# Patient Record
Sex: FEMALE | Race: WHITE | NOT HISPANIC OR LATINO | Employment: FULL TIME | URBAN - METROPOLITAN AREA
[De-identification: names, ages, dates, MRNs, and addresses within clinical notes are randomized per-mention and may not be internally consistent; named-entity substitution may affect disease eponyms.]

---

## 2018-11-26 ENCOUNTER — HOSPITAL ENCOUNTER (EMERGENCY)
Facility: HOSPITAL | Age: 33
Discharge: HOME | End: 2018-11-26
Attending: EMERGENCY MEDICINE
Payer: COMMERCIAL

## 2018-11-26 ENCOUNTER — APPOINTMENT (EMERGENCY)
Dept: RADIOLOGY | Facility: HOSPITAL | Age: 33
End: 2018-11-26
Payer: COMMERCIAL

## 2018-11-26 VITALS
SYSTOLIC BLOOD PRESSURE: 142 MMHG | HEART RATE: 77 BPM | OXYGEN SATURATION: 100 % | WEIGHT: 175 LBS | DIASTOLIC BLOOD PRESSURE: 68 MMHG | TEMPERATURE: 98.4 F | RESPIRATION RATE: 18 BRPM

## 2018-11-26 DIAGNOSIS — S39.012A LUMBAR STRAIN: Primary | ICD-10-CM

## 2018-11-26 PROCEDURE — 99284 EMERGENCY DEPT VISIT MOD MDM: CPT

## 2018-11-26 PROCEDURE — 72100 X-RAY EXAM L-S SPINE 2/3 VWS: CPT

## 2018-11-26 RX ORDER — IBUPROFEN 600 MG/1
600 TABLET ORAL ONCE
Status: DISCONTINUED | OUTPATIENT
Start: 2018-11-26 | End: 2018-11-26 | Stop reason: HOSPADM
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
## 2018-11-27 NOTE — DISCHARGE INSTRUCTIONS
Low Back Strain, Ambulatory Care   GENERAL INFORMATION:   Low back strain  is an injury to your lower back muscles or tendons  Tendons are strong tissues that connect muscles to bones  The lower back supports most of your body weight and helps you move, twist, and bend  Low back strain is usually caused by activities that increase stress on the lower back, such as exercise or injury  Common symptoms include the following:   · Low back pain or muscle spasms    · Stiffness or limited movement    · Pain that goes down to the buttocks, groin, or legs    · Pain that is worse with activity  Seek immediate care for the following symptoms:   · A pop in your lower back    · Increased swelling or pain in your lower back    · Trouble moving your legs    · Numbness in your legs  Treatment for low back strain:   · NSAIDs  help decrease swelling and pain or fever  This medicine is available with or without a doctor's order  NSAIDs can cause stomach bleeding or kidney problems in certain people  If you take blood thinner medicine, always ask your healthcare provider if NSAIDs are safe for you  Always read the medicine label and follow directions  · Muscle relaxers  help decrease muscle spasms pain  · Prescription pain medicine  may be given  Ask how to take this medicine safely  Manage your symptoms:   · Rest  in bed after your injury  Slowly start to increase your activity as the pain decreases, or as directed  · Apply ice  on your lower back for 15 to 20 minutes every hour or as directed  Use an ice pack, or put crushed ice in a plastic bag  Cover it with a towel  Ice helps prevent tissue damage and decreases swelling and pain  You can alternate ice and heat  · Apply heat  on your lower back for 20 to 30 minutes every 2 hours for as many days as directed  Heat helps decrease pain and muscle spasms  Prevent another low back strain:   · Use proper body mechanics        ¨ Bend at the hips and knees when you  objects  Do not bend from the waist  Use your leg muscles as you lift the load  Do not use your back  Keep the object close to your chest as you lift it  Try not to twist or lift anything above your waist     ¨ Change your position often when you stand for long periods of time  Rest one foot on a small box or footrest, and then switch to the other foot often  ¨ Try not to sit for long periods of time  When you do, sit in a straight-backed chair with your feet flat on the floor  Never reach, pull, or push while you are sitting  · Exercise regularly  Warm up before you exercise  Do exercises that strengthen your back muscles  Ask about the best exercise plan for you  · Maintain a healthy weight  Ask your healthcare provider how much you should weigh  Ask him to help you create a weight loss plan if you are overweight  Follow up with your healthcare provider as directed:  Write down your questions so you remember to ask them during your visits  CARE AGREEMENT:   You have the right to help plan your care  Learn about your health condition and how it may be treated  Discuss treatment options with your caregivers to decide what care you want to receive  You always have the right to refuse treatment  The above information is an  only  It is not intended as medical advice for individual conditions or treatments  Talk to your doctor, nurse or pharmacist before following any medical regimen to see if it is safe and effective for you  © 2014 [address] is for End User's use only and may not be sold, redistributed or otherwise used for commercial purposes  All illustrations and images included in CareNotes® are the copyrighted property of [employer]  or [employer]

## 2020-07-24 ENCOUNTER — APPOINTMENT (EMERGENCY)
Dept: CT IMAGING | Facility: HOSPITAL | Age: 35
End: 2020-07-24
Payer: COMMERCIAL

## 2020-07-24 ENCOUNTER — HOSPITAL ENCOUNTER (EMERGENCY)
Facility: HOSPITAL | Age: 35
Discharge: HOME | End: 2020-07-24
Attending: EMERGENCY MEDICINE | Admitting: EMERGENCY MEDICINE
Payer: COMMERCIAL

## 2020-07-24 VITALS
RESPIRATION RATE: 20 BRPM | DIASTOLIC BLOOD PRESSURE: 60 MMHG | WEIGHT: 177.91 LBS | HEIGHT: 61 IN | BODY MASS INDEX: 33.59 KG/M2 | SYSTOLIC BLOOD PRESSURE: 102 MMHG | TEMPERATURE: 98.2 F | HEART RATE: 58 BPM | OXYGEN SATURATION: 98 %

## 2020-07-24 DIAGNOSIS — R20.2 PARESTHESIA OF RIGHT ARM AND LEG: ICD-10-CM

## 2020-07-24 DIAGNOSIS — R51.9 HEADACHE: ICD-10-CM

## 2020-07-24 DIAGNOSIS — R20.2 FACIAL PARESTHESIA: Primary | ICD-10-CM

## 2020-07-24 LAB
ANION GAP SERPL CALCULATED.3IONS-SCNC: 11 MMOL/L (ref 4–13)
APTT PPP: 33 SECONDS (ref 23–37)
ATRIAL RATE: 71 BPM
B-HCG SERPL-ACNC: <2 MIU/ML
BUN SERPL-MCNC: 13 MG/DL (ref 5–25)
CALCIUM SERPL-MCNC: 8.6 MG/DL (ref 8.3–10.1)
CHLORIDE SERPL-SCNC: 106 MMOL/L (ref 100–108)
CO2 SERPL-SCNC: 24 MMOL/L (ref 21–32)
CREAT SERPL-MCNC: 0.8 MG/DL (ref 0.6–1.3)
ERYTHROCYTE [DISTWIDTH] IN BLOOD BY AUTOMATED COUNT: 13.3 % (ref 11.6–15.1)
GFR SERPL CREATININE-BSD FRML MDRD: 98 ML/MIN/1.73SQ M
GLUCOSE SERPL-MCNC: 72 MG/DL (ref 65–140)
GLUCOSE SERPL-MCNC: 88 MG/DL (ref 65–140)
HCT VFR BLD AUTO: 41.7 % (ref 34.8–46.1)
HGB BLD-MCNC: 13.9 G/DL (ref 11.5–15.4)
INR PPP: 1.06 (ref 0.84–1.19)
MCH RBC QN AUTO: 30 PG (ref 26.8–34.3)
MCHC RBC AUTO-ENTMCNC: 33.3 G/DL (ref 31.4–37.4)
MCV RBC AUTO: 90 FL (ref 82–98)
P AXIS: 58 DEGREES
PLATELET # BLD AUTO: 185 THOUSANDS/UL (ref 149–390)
PMV BLD AUTO: 11.3 FL (ref 8.9–12.7)
POTASSIUM SERPL-SCNC: 3.6 MMOL/L (ref 3.5–5.3)
PR INTERVAL: 156 MS
PROTHROMBIN TIME: 14 SECONDS (ref 11.6–14.5)
QRS AXIS: 67 DEGREES
QRSD INTERVAL: 90 MS
QT INTERVAL: 386 MS
QTC INTERVAL: 419 MS
RBC # BLD AUTO: 4.63 MILLION/UL (ref 3.81–5.12)
SODIUM SERPL-SCNC: 141 MMOL/L (ref 136–145)
T WAVE AXIS: 45 DEGREES
TROPONIN I SERPL-MCNC: <0.02 NG/ML
VENTRICULAR RATE: 71 BPM
WBC # BLD AUTO: 8.27 THOUSAND/UL (ref 4.31–10.16)

## 2020-07-24 PROCEDURE — 36415 COLL VENOUS BLD VENIPUNCTURE: CPT | Performed by: PHYSICIAN ASSISTANT

## 2020-07-24 PROCEDURE — 99285 EMERGENCY DEPT VISIT HI MDM: CPT | Performed by: PHYSICIAN ASSISTANT

## 2020-07-24 PROCEDURE — 85027 COMPLETE CBC AUTOMATED: CPT | Performed by: PHYSICIAN ASSISTANT

## 2020-07-24 PROCEDURE — 70496 CT ANGIOGRAPHY HEAD: CPT

## 2020-07-24 PROCEDURE — 84702 CHORIONIC GONADOTROPIN TEST: CPT | Performed by: PHYSICIAN ASSISTANT

## 2020-07-24 PROCEDURE — 93005 ELECTROCARDIOGRAM TRACING: CPT

## 2020-07-24 PROCEDURE — 70498 CT ANGIOGRAPHY NECK: CPT

## 2020-07-24 PROCEDURE — 99285 EMERGENCY DEPT VISIT HI MDM: CPT

## 2020-07-24 PROCEDURE — 80048 BASIC METABOLIC PNL TOTAL CA: CPT | Performed by: PHYSICIAN ASSISTANT

## 2020-07-24 PROCEDURE — 96374 THER/PROPH/DIAG INJ IV PUSH: CPT

## 2020-07-24 PROCEDURE — 82948 REAGENT STRIP/BLOOD GLUCOSE: CPT

## 2020-07-24 PROCEDURE — 86618 LYME DISEASE ANTIBODY: CPT | Performed by: PHYSICIAN ASSISTANT

## 2020-07-24 PROCEDURE — 85610 PROTHROMBIN TIME: CPT | Performed by: PHYSICIAN ASSISTANT

## 2020-07-24 PROCEDURE — 84484 ASSAY OF TROPONIN QUANT: CPT | Performed by: PHYSICIAN ASSISTANT

## 2020-07-24 PROCEDURE — 85730 THROMBOPLASTIN TIME PARTIAL: CPT | Performed by: PHYSICIAN ASSISTANT

## 2020-07-24 PROCEDURE — 93010 ELECTROCARDIOGRAM REPORT: CPT | Performed by: INTERNAL MEDICINE

## 2020-07-24 RX ORDER — METOCLOPRAMIDE HYDROCHLORIDE 5 MG/ML
10 INJECTION INTRAMUSCULAR; INTRAVENOUS ONCE
Status: DISCONTINUED | OUTPATIENT
Start: 2020-07-24 | End: 2020-07-24 | Stop reason: HOSPADM

## 2020-07-24 RX ORDER — ONDANSETRON 2 MG/ML
4 INJECTION INTRAMUSCULAR; INTRAVENOUS ONCE
Status: COMPLETED | OUTPATIENT
Start: 2020-07-24 | End: 2020-07-24

## 2020-07-24 RX ORDER — DIPHENHYDRAMINE HYDROCHLORIDE 50 MG/ML
25 INJECTION INTRAMUSCULAR; INTRAVENOUS ONCE
Status: DISCONTINUED | OUTPATIENT
Start: 2020-07-24 | End: 2020-07-24 | Stop reason: HOSPADM

## 2020-07-24 RX ADMIN — ONDANSETRON 4 MG: 2 INJECTION INTRAMUSCULAR; INTRAVENOUS at 05:41

## 2020-07-24 RX ADMIN — IOHEXOL 85 ML: 350 INJECTION, SOLUTION INTRAVENOUS at 06:40

## 2020-07-24 NOTE — ED CARE HANDOFF
Emergency Department Sign Out Note        Sign out and transfer of care from Kushal Olivas PA-C  See Separate Emergency Department note  The patient, Chris Crane, was evaluated by the previous provider for facial numbness  Workup Completed:  Labs    ED Course / Workup Pending (followup):  CTA head  Patient's laboratory evaluation was unremarkable  CT of the head radiology report was reviewed  No acute abnormalities  Patient's symptoms completely resolved during her time here in the emergency department  I reviewed her CT scan results with her at bedside  Discussed diagnosis of facial paresthesias  Discussed outpatient follow up with primary care physician and Neurology for further evaluation of symptoms  Reviewed reasons to return to ed  Patient verbalized understanding of diagnosis and agreement with discharge plan of care as well as understanding of reasons to return to ed  Procedures  MDM    Disposition  Final diagnoses:   Facial paresthesia   Paresthesia of right arm and leg   Headache     Time reflects when diagnosis was documented in both MDM as applicable and the Disposition within this note     Time User Action Codes Description Comment    7/24/2020  7:09 AM Abby Bucy Add [R20 2] Facial paresthesia     7/24/2020  7:09 AM Abby Bucy Add [R20 2] Paresthesia of right arm and leg     7/24/2020  7:09 AM Abby Bucy Add [R51] Headache       ED Disposition     ED Disposition Condition Date/Time Comment    Discharge Stable Fri Jul 24, 2020  7:09 AM Chris Crane discharge to home/self care              Follow-up Information     Follow up With Specialties Details Why Contact Info Additional [facility] Schedule an appointment as soon as possible for a visit in 3 days For further evaluation [address] [facility] [id number]  [address] Neu[facility] [address] [facility] JUAN JOSÉ[facility] HospitalVANELily[address], [address]    [facility] Emergency Department Emergency Medicine Go to  If symptoms worsen [address], [address] [place], [place], [address]        There are no discharge medications for this patient  No discharge procedures on file         ED Provider  Electronically Signed by     Alejandro Alejandre PA-C  07/24/20 0625

## 2020-07-24 NOTE — ED PROVIDER NOTES
History  Chief Complaint   Patient presents with    Facial Numbness     pt c/o intermittant tingling and numbness in her R cheek; pt also c/o numbness in R arm as well; pt first noticed symptoms when she woke up in the night to use the bathroom      72-year-old female with past medical history significant for anxiety, anemia, and allergic rhinitis who presents to the emergency department for complaint of right-sided facial numbness and right arm numbness noticed this morning upon waking to use the bathroom  Patient reports her daughter woke her up suddenly from sleeping and symptoms began shortly after this around 3A  She admits to a numb sensation on the inside of the right cheek, reports it feels like she is drooling and cannot control secretions, also reports right arm and leg paresthesias and headache coming and going with nausea  She called her father to come to the house, states symptoms seemed to improve after 45 minutes, then returned again  Has a hx of sporadic anxiety, not on any meds, is unsure if it could be this but has never had these specific symptoms with anxiety/panic in the past  Denies facial droop, slurred speech, confusion, weakness or extremities, lightheadedness/dizziness, syncope, visual disturbances, vomiting  Denies any recent dental work or feeling of intraoral swelling  Denies any recent tick bite or extraction  No hx of Lyme or CVA  Denies recent viral or flu-like symptoms  No new meds or supplements  No alcohol or drug use  None       History reviewed  No pertinent past medical history  Past Surgical History:   Procedure Laterality Date    BREAST SURGERY       SECTION      LIPOSUCTION         History reviewed  No pertinent family history  I have reviewed and agree with the history as documented      E-Cigarette/Vaping     E-Cigarette/Vaping Substances     Social History     Tobacco Use    Smoking status: Never Smoker    Smokeless tobacco: Never Used Substance Use Topics    Alcohol use: No    Drug use: No       Review of Systems   Constitutional: Negative for chills, diaphoresis, fatigue and fever  HENT: Positive for drooling  Negative for congestion, dental problem, facial swelling, sinus pressure, sinus pain, sore throat, trouble swallowing and voice change  Eyes: Negative for photophobia, pain and visual disturbance  Respiratory: Negative for chest tightness and shortness of breath  Cardiovascular: Negative for chest pain and palpitations  Gastrointestinal: Positive for nausea  Negative for abdominal pain and vomiting  Musculoskeletal: Negative for gait problem, neck pain and neck stiffness  Skin: Negative for color change and rash  Neurological: Positive for numbness and headaches  Negative for dizziness, tremors, seizures, syncope, facial asymmetry, speech difficulty, weakness and light-headedness  Psychiatric/Behavioral: Negative for confusion  All other systems reviewed and are negative  Physical Exam  Physical Exam   Constitutional: She is oriented to person, place, and time  She appears well-developed and well-nourished  She is cooperative  Non-toxic appearance  No distress  No large facial droop, possibly some very slight right side mouth droop    HENT:   Head: Normocephalic and atraumatic  Mouth/Throat: Uvula is midline, oropharynx is clear and moist and mucous membranes are normal  Tonsils are 0 on the right  Tonsils are 0 on the left  Eyes: Pupils are equal, round, and reactive to light  Conjunctivae and EOM are normal    Neck: Normal range of motion and full passive range of motion without pain  Neck supple  Cardiovascular: Normal rate, regular rhythm, normal heart sounds, intact distal pulses and normal pulses  No murmur heard  Pulmonary/Chest: Effort normal and breath sounds normal    Musculoskeletal: Normal range of motion  Neurological: She is alert and oriented to person, place, and time   She has normal strength  She displays no tremor  A sensory deficit (decreased sensation to dull touch on the medial aspect of the RUE; trigeminal N fields intact, all other extremities with sensation intact) is present  No cranial nerve deficit  She displays a negative Romberg sign  Coordination and gait normal  GCS eye subscore is 4  GCS verbal subscore is 5  GCS motor subscore is 6  Patient became tearful and anxious during neuro exam, stating "I think I'm having an anxiety attack"   Skin: Skin is warm  Capillary refill takes less than 2 seconds  No lesion and no rash noted  No erythema  Psychiatric: Her speech is normal and behavior is normal  Thought content normal  Her mood appears anxious  Vitals reviewed        Vital Signs  ED Triage Vitals   Temperature Pulse Respirations Blood Pressure SpO2   07/24/20 0459 07/24/20 0457 07/24/20 0457 07/24/20 0457 07/24/20 0457   98 2 °F (36 8 °C) 77 18 120/84 100 %      Temp Source Heart Rate Source Patient Position - Orthostatic VS BP Location FiO2 (%)   07/24/20 0459 07/24/20 0457 07/24/20 0457 07/24/20 0457 --   Oral Monitor Sitting Right arm       Pain Score       07/24/20 0457       No Pain           Vitals:    07/24/20 0530 07/24/20 0545 07/24/20 0600 07/24/20 0630   BP: 101/61 108/65 108/65 102/60   Pulse: 75 70 59 58   Patient Position - Orthostatic VS: Sitting Sitting Sitting Sitting         Visual Acuity  Visual Acuity      Most Recent Value   L Pupil Size (mm)  3   R Pupil Size (mm)  3          ED Medications  Medications   ondansetron (ZOFRAN) injection 4 mg (4 mg Intravenous Given 7/24/20 0541)   iohexol (OMNIPAQUE) 350 MG/ML injection (MULTI-DOSE) 85 mL (85 mL Intravenous Given 7/24/20 0640)       Diagnostic Studies  Results Reviewed     Procedure Component Value Units Date/Time    Fingerstick Glucose (POCT) [[id number]]  (Normal) Collected:  07/24/20 0604    Lab Status:  Final result Updated:  07/24/20 0627     POC Glucose 72 mg/dl     Basic metabolic panel [[id number]] Collected:  07/24/20 0541    Lab Status:  Final result Specimen:  Blood from Arm, Left Updated:  07/24/20 1561     Sodium 141 mmol/L      Potassium 3 6 mmol/L      Chloride 106 mmol/L      CO2 24 mmol/L      ANION GAP 11 mmol/L      BUN 13 mg/dL      Creatinine 0 80 mg/dL      Glucose 88 mg/dL      Calcium 8 6 mg/dL      eGFR 98 ml/min/1 73sq m     Narrative:       National Kidney Disease Foundation guidelines for Chronic Kidney Disease (CKD):     Stage 1 with normal or high GFR (GFR > 90 mL/min/1 73 square meters)    Stage 2 Mild CKD (GFR = 60-89 mL/min/1 73 square meters)    Stage 3A Moderate CKD (GFR = 45-59 mL/min/1 73 square meters)    Stage 3B Moderate CKD (GFR = 30-44 mL/min/1 73 square meters)    Stage 4 Severe CKD (GFR = 15-29 mL/min/1 73 square meters)    Stage 5 End Stage CKD (GFR <15 mL/min/1 73 square meters)  Note: GFR calculation is accurate only with a steady state creatinine    Quantitative hCG [[id number]]  (Normal) Collected:  07/24/20 0541    Lab Status:  Final result Specimen:  Blood from Arm, Left Updated:  07/24/20 0619     HCG, Quant <2 mIU/mL     Narrative:        Expected Ranges:     Approximate               Approximate HCG  Gestation age          Concentration ( mIU/mL)  _____________          ______________________   [facility]                      HCG values  0 2-1                       5-50  1-2                           2-3                         100-5000  3-4                         500-65745  4-5                         1000-78024  5-6                         [id number]  6-8                         37612-165909  8-12                        38092-575791      Troponin I [[id number]]  (Normal) Collected:  07/24/20 0541    Lab Status:  Final result Specimen:  Blood from Arm, Left Updated:  07/24/20 0614     Troponin I <0 02 ng/mL     Protime-INR [[id number]]  (Normal) Collected:  07/24/20 0541    Lab Status:  Final result Specimen:  Blood from Arm, Left Updated: 07/24/20 0608     Protime 14 0 seconds      INR 1 06    APTT [[id number]]  (Normal) Collected:  07/24/20 0541    Lab Status:  Final result Specimen:  Blood from Arm, Left Updated:  07/24/20 0608     PTT 33 seconds     CBC and Platelet [[id number]]  (Normal) Collected:  07/24/20 0541    Lab Status:  Final result Specimen:  Blood from Arm, Left Updated:  07/24/20 0602     WBC 8 27 Thousand/uL      RBC 4 63 Million/uL      Hemoglobin 13 9 g/dL      Hematocrit 41 7 %      MCV 90 fL      MCH 30 0 pg      MCHC 33 3 g/dL      RDW 13 3 %      Platelets 690 Thousands/uL      MPV 11 3 fL     Lyme Antibody Profile with reflex to [facility] [[id number]] Collected:  07/24/20 0541    Lab Status: In process Specimen:  Blood from Arm, Left Updated:  07/24/20 0546                 CTA head and neck with and without contrast   Final Result by Sanjay Lauren DO (07/24 2792)   1  No acute intracranial abnormality  2   Unremarkable CTA of the neck and brain  No evidence of aneurysm, vascular malformation or vascular cut off  No evidence of large vessel central occlusive disease involving the [place]  3   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  4   Partially empty sella turcica  Clinical correlation recommended  Consider follow-up neurology consultation  Workstation performed: [id number]                    Procedures  Procedures         ED Course  ED Course as of Jul 25 0312 Fri Jul 24, 2020   1394 Patient would like to hold off on Reglan and Benadryl, per nursing, due to issue with burning from IV after Zofran  8826 CTA head and neck with and without contrast   0708 On re-assessment, patient states symptoms have resolved, she is feeling better  Pending normal scan, she will be appropriate for dc  Will provide neuro f/u, should this recur  US AUDIT      Most Recent Value   Initial Alcohol Screen: US AUDIT-C    1   How often do you have a drink containing alcohol?  0 Filed at: 07/24/2020 0752   2  How many drinks containing alcohol do you have on a typical day you are drinking? 0 Filed at: 07/24/2020 0752   3b  FEMALE Any Age, or MALE 65+: How often do you have 4 or more drinks on one occassion? 0 Filed at: 07/24/2020 0752   Audit-C Score  0 Filed at: 07/24/2020 4069                  SARA/DAST-10      Most Recent Value   How many times in the past year have you    Used an illegal drug or used a prescription medication for non-medical reasons? Never Filed at: 07/24/2020 1316                                MDM  Number of Diagnoses or Management Options  Facial paresthesia:   Headache:   Paresthesia of right arm and leg:   Diagnosis management comments: Diff dx includes:  CVA, cerebral dissection, complex migraine, Lyme disease, Bell's Palsy, anxiety, hypo or hyperglycemia, thyroid disease, electrolyte abnormality       Amount and/or Complexity of Data Reviewed  Clinical lab tests: ordered and reviewed  Tests in the radiology section of CPT®: ordered and reviewed  Tests in the medicine section of CPT®: ordered and reviewed  Discussion of test results with the performing providers: yes  Decide to obtain previous medical records or to obtain history from someone other than the patient: yes  Obtain history from someone other than the patient: yes  Review and summarize past medical records: yes  Discuss the patient with other providers: yes  Independent visualization of images, tracings, or specimens: yes    Risk of Complications, Morbidity, and/or Mortality  General comments: See ED course note for dispo and plan  I reviewed and discussed all lab and imaging findings with the patient at bedside  I discussed emergency department return parameters  I answered any and all questions the patient had regarding emergency department course of evaluation and treatment  The patient verbalized understanding of and agreement with plan        Patient Progress  Patient progress: improved        Disposition  Final diagnoses:   Facial paresthesia   Paresthesia of right arm and leg   Headache     Time reflects when diagnosis was documented in both MDM as applicable and the Disposition within this note     Time User Action Codes Description Comment    7/24/2020  7:09 AM Charlie Bumpers Add [R20 2] Facial paresthesia     7/24/2020  7:09 AM Charlie Bumpers Add [R20 2] Paresthesia of right arm and leg     7/24/2020  7:09 AM Charlie Bumpers Add [R51] Headache       ED Disposition     ED Disposition Condition Date/Time Comment    Discharge Stable Fri Jul 24, 2020  7:09 AM Saritha Neville discharge to home/self care  Follow-up Information     Follow up With Specialties Details Why Contact Info Additional [facility] Schedule an appointment as soon as possible for a visit in 3 days For further evaluation [address] [facility] [id number]  [address] Neurology [address] [facility], [address], [address] [facility]    [address] [facility] Emergency Department Emergency Medicine Go to  If symptoms worsen [address] [facility] [address], [address] [place], [place], [address]          There are no discharge medications for this patient  No discharge procedures on file      PDMP Review     None          ED Provider  Electronically Signed by           Jem Fragoso PA-C  07/25/20 8009

## 2020-07-25 LAB — B BURGDOR IGG+IGM SER-ACNC: <0.91 ISR (ref 0–0.9)

## 2020-12-16 ENCOUNTER — HOSPITAL ENCOUNTER (EMERGENCY)
Facility: HOSPITAL | Age: 35
Discharge: HOME | End: 2020-12-16
Attending: EMERGENCY MEDICINE | Admitting: EMERGENCY MEDICINE
Payer: COMMERCIAL

## 2020-12-16 ENCOUNTER — APPOINTMENT (EMERGENCY)
Dept: ULTRASOUND IMAGING | Facility: HOSPITAL | Age: 35
End: 2020-12-16
Payer: COMMERCIAL

## 2020-12-16 VITALS
OXYGEN SATURATION: 99 % | DIASTOLIC BLOOD PRESSURE: 69 MMHG | RESPIRATION RATE: 17 BRPM | SYSTOLIC BLOOD PRESSURE: 115 MMHG | BODY MASS INDEX: 33.16 KG/M2 | WEIGHT: 175.49 LBS | HEART RATE: 80 BPM | TEMPERATURE: 97.2 F

## 2020-12-16 DIAGNOSIS — O46.90 VAGINAL BLEEDING IN PREGNANCY: Primary | ICD-10-CM

## 2020-12-16 LAB
ABO GROUP BLD: NORMAL
ALBUMIN SERPL BCP-MCNC: 3.7 G/DL (ref 3.5–5)
ALP SERPL-CCNC: 74 U/L (ref 46–116)
ALT SERPL W P-5'-P-CCNC: 33 U/L (ref 12–78)
ANION GAP SERPL CALCULATED.3IONS-SCNC: 10 MMOL/L (ref 4–13)
AST SERPL W P-5'-P-CCNC: 29 U/L (ref 5–45)
B-HCG SERPL-ACNC: 417 MIU/ML
BACTERIA UR QL AUTO: NORMAL /HPF
BASOPHILS # BLD AUTO: 0.08 THOUSANDS/ΜL (ref 0–0.1)
BASOPHILS NFR BLD AUTO: 1 % (ref 0–1)
BILIRUB SERPL-MCNC: 0.6 MG/DL (ref 0.2–1)
BILIRUB UR QL STRIP: NEGATIVE
BUN SERPL-MCNC: 11 MG/DL (ref 5–25)
CALCIUM SERPL-MCNC: 9.2 MG/DL (ref 8.3–10.1)
CHLORIDE SERPL-SCNC: 106 MMOL/L (ref 100–108)
CLARITY UR: ABNORMAL
CO2 SERPL-SCNC: 25 MMOL/L (ref 21–32)
COLOR UR: ABNORMAL
CREAT SERPL-MCNC: 0.78 MG/DL (ref 0.6–1.3)
EOSINOPHIL # BLD AUTO: 0.04 THOUSAND/ΜL (ref 0–0.61)
EOSINOPHIL NFR BLD AUTO: 0 % (ref 0–6)
ERYTHROCYTE [DISTWIDTH] IN BLOOD BY AUTOMATED COUNT: 14.2 % (ref 11.6–15.1)
EXT PREG TEST URINE: POSITIVE
EXT. CONTROL ED NAV: ABNORMAL
GFR SERPL CREATININE-BSD FRML MDRD: 100 ML/MIN/1.73SQ M
GLUCOSE SERPL-MCNC: 90 MG/DL (ref 65–140)
GLUCOSE UR STRIP-MCNC: NEGATIVE MG/DL
HCT VFR BLD AUTO: 38.4 % (ref 34.8–46.1)
HGB BLD-MCNC: 12.7 G/DL (ref 11.5–15.4)
HGB UR QL STRIP.AUTO: ABNORMAL
IMM GRANULOCYTES # BLD AUTO: 0.03 THOUSAND/UL (ref 0–0.2)
IMM GRANULOCYTES NFR BLD AUTO: 0 % (ref 0–2)
KETONES UR STRIP-MCNC: NEGATIVE MG/DL
LEUKOCYTE ESTERASE UR QL STRIP: NEGATIVE
LYMPHOCYTES # BLD AUTO: 2.37 THOUSANDS/ΜL (ref 0.6–4.47)
LYMPHOCYTES NFR BLD AUTO: 24 % (ref 14–44)
MCH RBC QN AUTO: 28.3 PG (ref 26.8–34.3)
MCHC RBC AUTO-ENTMCNC: 33.1 G/DL (ref 31.4–37.4)
MCV RBC AUTO: 86 FL (ref 82–98)
MONOCYTES # BLD AUTO: 0.63 THOUSAND/ΜL (ref 0.17–1.22)
MONOCYTES NFR BLD AUTO: 7 % (ref 4–12)
NEUTROPHILS # BLD AUTO: 6.57 THOUSANDS/ΜL (ref 1.85–7.62)
NEUTS SEG NFR BLD AUTO: 68 % (ref 43–75)
NITRITE UR QL STRIP: NEGATIVE
NON-SQ EPI CELLS URNS QL MICRO: NORMAL /HPF
NRBC BLD AUTO-RTO: 0 /100 WBCS
PH UR STRIP.AUTO: 6 [PH]
PLATELET # BLD AUTO: 229 THOUSANDS/UL (ref 149–390)
PMV BLD AUTO: 10.3 FL (ref 8.9–12.7)
POTASSIUM SERPL-SCNC: 3.9 MMOL/L (ref 3.5–5.3)
PROT SERPL-MCNC: 7 G/DL (ref 6.4–8.2)
PROT UR STRIP-MCNC: NEGATIVE MG/DL
RBC # BLD AUTO: 4.49 MILLION/UL (ref 3.81–5.12)
RBC #/AREA URNS AUTO: NORMAL /HPF
RH BLD: POSITIVE
SODIUM SERPL-SCNC: 141 MMOL/L (ref 136–145)
SP GR UR STRIP.AUTO: <=1.005 (ref 1–1.03)
UROBILINOGEN UR QL STRIP.AUTO: 0.2 E.U./DL
WBC # BLD AUTO: 9.72 THOUSAND/UL (ref 4.31–10.16)
WBC #/AREA URNS AUTO: NORMAL /HPF

## 2020-12-16 PROCEDURE — 86900 BLOOD TYPING SEROLOGIC ABO: CPT | Performed by: EMERGENCY MEDICINE

## 2020-12-16 PROCEDURE — 84702 CHORIONIC GONADOTROPIN TEST: CPT | Performed by: EMERGENCY MEDICINE

## 2020-12-16 PROCEDURE — 81001 URINALYSIS AUTO W/SCOPE: CPT | Performed by: EMERGENCY MEDICINE

## 2020-12-16 PROCEDURE — 99284 EMERGENCY DEPT VISIT MOD MDM: CPT | Performed by: EMERGENCY MEDICINE

## 2020-12-16 PROCEDURE — 86901 BLOOD TYPING SEROLOGIC RH(D): CPT | Performed by: EMERGENCY MEDICINE

## 2020-12-16 PROCEDURE — 76815 OB US LIMITED FETUS(S): CPT

## 2020-12-16 PROCEDURE — 80053 COMPREHEN METABOLIC PANEL: CPT | Performed by: EMERGENCY MEDICINE

## 2020-12-16 PROCEDURE — 81025 URINE PREGNANCY TEST: CPT | Performed by: EMERGENCY MEDICINE

## 2020-12-16 PROCEDURE — 36415 COLL VENOUS BLD VENIPUNCTURE: CPT | Performed by: EMERGENCY MEDICINE

## 2020-12-16 PROCEDURE — 99284 EMERGENCY DEPT VISIT MOD MDM: CPT

## 2020-12-16 PROCEDURE — 85025 COMPLETE CBC W/AUTO DIFF WBC: CPT | Performed by: EMERGENCY MEDICINE

## 2020-12-16 RX ORDER — ACETAMINOPHEN 325 MG/1
650 TABLET ORAL ONCE
Status: COMPLETED | OUTPATIENT
Start: 2020-12-16 | End: 2020-12-16

## 2020-12-16 RX ADMIN — ACETAMINOPHEN 650 MG: 325 TABLET, FILM COATED ORAL at 13:16

## 2024-05-01 ENCOUNTER — HOSPITAL ENCOUNTER (EMERGENCY)
Facility: HOSPITAL | Age: 39
Discharge: HOME | End: 2024-05-02
Attending: EMERGENCY MEDICINE | Admitting: EMERGENCY MEDICINE
Payer: COMMERCIAL

## 2024-05-01 DIAGNOSIS — N83.209 OVARIAN CYST: ICD-10-CM

## 2024-05-01 DIAGNOSIS — M79.89 LEG SWELLING: ICD-10-CM

## 2024-05-01 DIAGNOSIS — O03.4 RETAINED PRODUCTS OF CONCEPTION FOLLOWING ABORTION: ICD-10-CM

## 2024-05-01 DIAGNOSIS — N39.0 UTI (URINARY TRACT INFECTION): Primary | ICD-10-CM

## 2024-05-01 PROCEDURE — 99284 EMERGENCY DEPT VISIT MOD MDM: CPT

## 2024-05-02 ENCOUNTER — APPOINTMENT (EMERGENCY)
Dept: CT IMAGING | Facility: HOSPITAL | Age: 39
End: 2024-05-02
Payer: COMMERCIAL

## 2024-05-02 VITALS
WEIGHT: 180 LBS | DIASTOLIC BLOOD PRESSURE: 55 MMHG | OXYGEN SATURATION: 98 % | SYSTOLIC BLOOD PRESSURE: 91 MMHG | BODY MASS INDEX: 33.99 KG/M2 | RESPIRATION RATE: 16 BRPM | HEIGHT: 61 IN | TEMPERATURE: 97.8 F | HEART RATE: 73 BPM

## 2024-05-02 LAB
ATRIAL RATE: 74 BPM
B-HCG SERPL-ACNC: 13.3 MIU/ML (ref 0–5)
BACTERIA UR QL AUTO: ABNORMAL /HPF
BASOPHILS # BLD AUTO: 0.11 THOUSANDS/ÂΜL (ref 0–0.1)
BASOPHILS NFR BLD AUTO: 1 % (ref 0–1)
BILIRUB UR QL STRIP: NEGATIVE
BNP SERPL-MCNC: 11 PG/ML (ref 0–100)
CARDIAC TROPONIN I PNL SERPL HS: <2 NG/L
CARDIAC TROPONIN I PNL SERPL HS: <2 NG/L
CLARITY UR: ABNORMAL
COLOR UR: YELLOW
D DIMER PPP FEU-MCNC: <0.27 UG/ML FEU
EOSINOPHIL # BLD AUTO: 0.34 THOUSAND/ÂΜL (ref 0–0.61)
EOSINOPHIL NFR BLD AUTO: 4 % (ref 0–6)
ERYTHROCYTE [DISTWIDTH] IN BLOOD BY AUTOMATED COUNT: 16.2 % (ref 11.6–15.1)
GLUCOSE UR STRIP-MCNC: NEGATIVE MG/DL
HCT VFR BLD AUTO: 40.7 % (ref 34.8–46.1)
HGB BLD-MCNC: 13.1 G/DL (ref 11.5–15.4)
HGB UR QL STRIP.AUTO: ABNORMAL
IMM GRANULOCYTES # BLD AUTO: 0.03 THOUSAND/UL (ref 0–0.2)
IMM GRANULOCYTES NFR BLD AUTO: 0 % (ref 0–2)
KETONES UR STRIP-MCNC: NEGATIVE MG/DL
LEUKOCYTE ESTERASE UR QL STRIP: ABNORMAL
LIPASE SERPL-CCNC: 59 U/L (ref 11–82)
LYMPHOCYTES # BLD AUTO: 3.87 THOUSANDS/ÂΜL (ref 0.6–4.47)
LYMPHOCYTES NFR BLD AUTO: 39 % (ref 14–44)
MCH RBC QN AUTO: 26.4 PG (ref 26.8–34.3)
MCHC RBC AUTO-ENTMCNC: 32.2 G/DL (ref 31.4–37.4)
MCV RBC AUTO: 82 FL (ref 82–98)
MONOCYTES # BLD AUTO: 0.74 THOUSAND/ÂΜL (ref 0.17–1.22)
MONOCYTES NFR BLD AUTO: 8 % (ref 4–12)
NEUTROPHILS # BLD AUTO: 4.74 THOUSANDS/ÂΜL (ref 1.85–7.62)
NEUTS SEG NFR BLD AUTO: 48 % (ref 43–75)
NITRITE UR QL STRIP: NEGATIVE
NON-SQ EPI CELLS URNS QL MICRO: ABNORMAL /HPF
NRBC BLD AUTO-RTO: 0 /100 WBCS
P AXIS: 69 DEGREES
PH UR STRIP.AUTO: 5.5 [PH]
PLATELET # BLD AUTO: 245 THOUSANDS/UL (ref 149–390)
PMV BLD AUTO: 11 FL (ref 8.9–12.7)
PR INTERVAL: 160 MS
PROT UR STRIP-MCNC: ABNORMAL MG/DL
QRS AXIS: 93 DEGREES
QRSD INTERVAL: 98 MS
QT INTERVAL: 394 MS
QTC INTERVAL: 437 MS
RBC # BLD AUTO: 4.97 MILLION/UL (ref 3.81–5.12)
RBC #/AREA URNS AUTO: ABNORMAL /HPF
SP GR UR STRIP.AUTO: 1.02 (ref 1–1.03)
T WAVE AXIS: 35 DEGREES
UROBILINOGEN UR STRIP-ACNC: <2 MG/DL
VENTRICULAR RATE: 74 BPM
WBC # BLD AUTO: 9.83 THOUSAND/UL (ref 4.31–10.16)
WBC #/AREA URNS AUTO: ABNORMAL /HPF
WBC CLUMPS # UR AUTO: PRESENT /UL

## 2024-05-02 PROCEDURE — 85025 COMPLETE CBC W/AUTO DIFF WBC: CPT | Performed by: EMERGENCY MEDICINE

## 2024-05-02 PROCEDURE — 85379 FIBRIN DEGRADATION QUANT: CPT

## 2024-05-02 PROCEDURE — 71275 CT ANGIOGRAPHY CHEST: CPT

## 2024-05-02 PROCEDURE — 96365 THER/PROPH/DIAG IV INF INIT: CPT

## 2024-05-02 PROCEDURE — 84702 CHORIONIC GONADOTROPIN TEST: CPT | Performed by: EMERGENCY MEDICINE

## 2024-05-02 PROCEDURE — 93005 ELECTROCARDIOGRAM TRACING: CPT

## 2024-05-02 PROCEDURE — 81001 URINALYSIS AUTO W/SCOPE: CPT | Performed by: EMERGENCY MEDICINE

## 2024-05-02 PROCEDURE — 74177 CT ABD & PELVIS W/CONTRAST: CPT

## 2024-05-02 PROCEDURE — 84484 ASSAY OF TROPONIN QUANT: CPT

## 2024-05-02 PROCEDURE — 36415 COLL VENOUS BLD VENIPUNCTURE: CPT

## 2024-05-02 PROCEDURE — 83690 ASSAY OF LIPASE: CPT | Performed by: EMERGENCY MEDICINE

## 2024-05-02 PROCEDURE — 99285 EMERGENCY DEPT VISIT HI MDM: CPT

## 2024-05-02 PROCEDURE — 87086 URINE CULTURE/COLONY COUNT: CPT | Performed by: EMERGENCY MEDICINE

## 2024-05-02 PROCEDURE — 80053 COMPREHEN METABOLIC PANEL: CPT | Performed by: EMERGENCY MEDICINE

## 2024-05-02 PROCEDURE — 83880 ASSAY OF NATRIURETIC PEPTIDE: CPT

## 2024-05-02 PROCEDURE — 87077 CULTURE AEROBIC IDENTIFY: CPT | Performed by: EMERGENCY MEDICINE

## 2024-05-02 PROCEDURE — 93010 ELECTROCARDIOGRAM REPORT: CPT | Performed by: INTERNAL MEDICINE

## 2024-05-02 PROCEDURE — 87186 SC STD MICRODIL/AGAR DIL: CPT | Performed by: EMERGENCY MEDICINE

## 2024-05-02 RX ORDER — CEFUROXIME AXETIL 500 MG/1
500 TABLET ORAL EVERY 12 HOURS SCHEDULED
Qty: 14 TABLET | Refills: 0 | Status: SHIPPED | OUTPATIENT
Start: 2024-05-02 | End: 2024-05-09

## 2024-05-02 RX ADMIN — IOHEXOL 100 ML: 350 INJECTION, SOLUTION INTRAVENOUS at 02:30

## 2024-05-02 RX ADMIN — CEFTRIAXONE SODIUM 1000 MG: 10 INJECTION, POWDER, FOR SOLUTION INTRAVENOUS at 03:19

## 2024-05-02 NOTE — ED PROVIDER NOTES
History  Chief Complaint   Patient presents with    Post-op Problem     Pt had an  6 weeks ago and is experiencing bilateral leg and feet swelling and severe pain. Pt does have some left over tissue on the right side and a cyst on the left. Pt has a D & C scheduled for this Friday. (Pt is requesting a female provider).     37 y/o female patient presents to the ER for evaluation of multiple complaints. Patient stated that she had an  6 weeks ago.  Patient stated that she returned to her OB/GYN and had an ultrasound which showed a left ovarian cyst and retained products.  Patient is scheduled for a D&C in 2 days.  Patient stated that she has been having lower abdominal pain.  No noted fever, chills, nausea or vomiting.  Patient denies flank pain, hematuria.  She does complain of increased urination with mild dysuria.  Patient also complains of bilateral leg swelling.  She stated that her legs have been in pain with no trauma or injury.  No noted ecchymosis, erythema, rash. Complains of dyspnea worse with exertion.  Has no known cardiac history. Patient has not had recent travel, hormone therapy, and history of PE/DVT.  Patient is not a smoker.        History provided by:  Patient      None       History reviewed. No pertinent past medical history.    Past Surgical History:   Procedure Laterality Date    BREAST SURGERY       SECTION      LIPOSUCTION         History reviewed. No pertinent family history.  I have reviewed and agree with the history as documented.    E-Cigarette/Vaping     E-Cigarette/Vaping Substances     Social History     Tobacco Use    Smoking status: Never    Smokeless tobacco: Never   Substance Use Topics    Alcohol use: No    Drug use: No       Review of Systems   Constitutional:  Negative for chills and fever.   HENT:  Negative for ear pain and sore throat.    Eyes:  Negative for pain and visual disturbance.   Respiratory:  Positive for shortness of breath. Negative for  cough.    Cardiovascular:  Positive for leg swelling. Negative for chest pain and palpitations.   Gastrointestinal:  Positive for abdominal pain. Negative for diarrhea, nausea and vomiting.   Genitourinary:  Positive for dysuria. Negative for hematuria.   Musculoskeletal:  Negative for arthralgias and back pain.   Skin:  Negative for color change and rash.   Neurological:  Negative for seizures and syncope.   Psychiatric/Behavioral:  The patient is nervous/anxious.    All other systems reviewed and are negative.      Physical Exam  Physical Exam  Vitals and nursing note reviewed.   Constitutional:       General: She is not in acute distress.     Appearance: She is well-developed.   HENT:      Head: Normocephalic and atraumatic.      Right Ear: External ear normal.      Left Ear: External ear normal.      Mouth/Throat:      Mouth: Mucous membranes are moist.   Eyes:      Conjunctiva/sclera: Conjunctivae normal.   Cardiovascular:      Rate and Rhythm: Normal rate and regular rhythm.      Pulses: Normal pulses.      Heart sounds: Normal heart sounds.      Comments: Trace b/l edema  Pulmonary:      Effort: Pulmonary effort is normal. No respiratory distress.      Breath sounds: Normal breath sounds.   Abdominal:      Palpations: Abdomen is soft.   Musculoskeletal:         General: No swelling.      Cervical back: Neck supple.   Skin:     General: Skin is warm and dry.      Capillary Refill: Capillary refill takes less than 2 seconds.   Neurological:      Mental Status: She is alert and oriented to person, place, and time. Mental status is at baseline.   Psychiatric:         Mood and Affect: Mood normal.         Behavior: Behavior normal.         Vital Signs  ED Triage Vitals [05/01/24 2223]   Temperature Pulse Respirations Blood Pressure SpO2   97.8 °F (36.6 °C) 88 20 129/74 100 %      Temp Source Heart Rate Source Patient Position - Orthostatic VS BP Location FiO2 (%)   Temporal Monitor Sitting Left arm --      Pain  Score       4           Vitals:    05/01/24 2223 05/02/24 0322   BP: 129/74 91/55   Pulse: 88 73   Patient Position - Orthostatic VS: Sitting          Visual Acuity      ED Medications  Medications   ceftriaxone (ROCEPHIN) 1 g/50 mL in dextrose IVPB (0 mg Intravenous Stopped 5/2/24 0349)   iohexol (OMNIPAQUE) 350 MG/ML injection (MULTI-DOSE) 100 mL (100 mL Intravenous Given 5/2/24 0230)       Diagnostic Studies  Results Reviewed       Procedure Component Value Units Date/Time    HS Troponin I 4hr [[id number]]     Lab Status: No result Specimen: Blood     HS Troponin I 2hr [[id number]] Collected: 05/02/24 0129    Lab Status: Final result Specimen: Blood from Arm, Right Updated: 05/02/24 0156     hs TnI 2hr <2 ng/L      Delta 2hr hsTnI --    D-dimer, quantitative [[id number]]  (Normal) Collected: 05/02/24 0126    Lab Status: Final result Specimen: Blood from Arm, Right Updated: 05/02/24 0151     D-Dimer, Quant <0.27 ug/ml FEU     Comprehensive metabolic panel [[id number]] Collected: 05/02/24 0126    Lab Status: Final result Specimen: Blood from Hand, Right Updated: 05/02/24 0149     Sodium 139 mmol/L      Potassium 3.9 mmol/L      Chloride 107 mmol/L      CO2 25 mmol/L      ANION GAP 7 mmol/L      BUN 15 mg/dL      Creatinine 0.86 mg/dL      Glucose 81 mg/dL      Calcium 9.1 mg/dL      AST 26 U/L      ALT 23 U/L      Alkaline Phosphatase 64 U/L      Total Protein 6.5 g/dL      Albumin 4.0 g/dL      Total Bilirubin 0.33 mg/dL      eGFR 87 ml/min/1.73sq m     Narrative:      National Kid[employer] guidelines for Chronic Kidney Disease (CKD):     Stage 1 with normal or high GFR (GFR > 90 mL/min/1.73 square meters)    Stage 2 Mild CKD (GFR = 60-89 mL/min/1.73 square meters)    Stage 3A Moderate CKD (GFR = 45-59 mL/min/1.73 square meters)    Stage 3B Moderate CKD (GFR = 30-44 mL/min/1.73 square meters)    Stage 4 Severe CKD (GFR = 15-29 mL/min/1.73 square meters)    Stage 5 End Stage CKD (GFR <15 mL/min/1.73 square  meters)  Note: GFR calculation is accurate only with a steady state creatinine    Lipase [[id number]]  (Normal) Collected: 05/02/24 0126    Lab Status: Final result Specimen: Blood from Hand, Right Updated: 05/02/24 0149     Lipase 59 u/L     hCG, quantitative, pregnancy [[id number]]  (Abnormal) Collected: 05/02/24 0006    Lab Status: Final result Specimen: Blood from Arm, Left Updated: 05/02/24 0044     HCG, Quant 13.3 mIU/mL     Narrative:       Expected Ranges:    HCG results between 5.0 and 25.0 mIU/mL may be indicative of early pregnancy but should be interpreted in light of the total clinical presentation.    HCG can rise to detectable levels in shawn and post menopausal women (0-11.6 mIU/mL).     Approximate               Approximate HCG  Gestation age          Concentration ( mIU/mL)  _____________          ______________________   Weeks                      HCG values  0.2-1                       5-50  1-2                           2-3                         100-5000  3-4                         500-30265  4-5                         1000-01756  5-6                         [id number]  6-8                         [id number]  8-12                        [id number]      B-Type Natriuretic Peptide(BNP) [[id number]]  (Normal) Collected: 05/02/24 0006    Lab Status: Final result Specimen: Blood from Arm, Left Updated: 05/02/24 0041     BNP 11 pg/mL     HS Troponin 0hr (reflex protocol) [[id number]]  (Normal) Collected: 05/02/24 0006    Lab Status: Final result Specimen: Blood from Arm, Left Updated: 05/02/24 0038     hs TnI 0hr <2 ng/L     Urine Microscopic [[id number]]  (Abnormal) Collected: 05/02/24 0006    Lab Status: Final result Specimen: Urine, Clean Catch Updated: 05/02/24 0021     RBC, UA 20-30 /hpf      WBC, UA Innumerable /hpf      Epithelial Cells Occasional /hpf      Bacteria, UA None Seen /hpf      WBC Clumps Present    Urine culture [[id number]] Collected: 05/02/24 0006    Lab Status: In  process Specimen: Urine, Clean Catch Updated: 05/02/24 0021    UA w Reflex to Microscopic w Reflex to Culture [[id number]]  (Abnormal) Collected: 05/02/24 0006    Lab Status: Final result Specimen: Urine, Clean Catch Updated: 05/02/24 0018     Color, UA Yellow     Clarity, UA Extra Turbid     Specific Gravity, UA 1.020     pH, UA 5.5     Leukocytes, UA Large     Nitrite, UA Negative     Protein, UA Trace mg/dl      Glucose, UA Negative mg/dl      Ketones, UA Negative mg/dl      Urobilinogen, UA <2.0 mg/dl      Bilirubin, UA Negative     Occult Blood, UA Large    CBC and differential [[id number]]  (Abnormal) Collected: 05/02/24 0006    Lab Status: Final result Specimen: Blood from Arm, Left Updated: 05/02/24 0017     WBC 9.83 Thousand/uL      RBC 4.97 Million/uL      Hemoglobin 13.1 g/dL      Hematocrit 40.7 %      MCV 82 fL      MCH 26.4 pg      MCHC 32.2 g/dL      RDW 16.2 %      MPV 11.0 fL      Platelets 245 Thousands/uL      nRBC 0 /100 WBCs      Segmented % 48 %      Immature Grans % 0 %      Lymphocytes % 39 %      Monocytes % 8 %      Eosinophils Relative 4 %      Basophils Relative 1 %      Absolute Neutrophils 4.74 Thousands/µL      Absolute Immature Grans 0.03 Thousand/uL      Absolute Lymphocytes 3.87 Thousands/µL      Absolute Monocytes 0.74 Thousand/µL      Eosinophils Absolute 0.34 Thousand/µL      Basophils Absolute 0.11 Thousands/µL                    PE Study with CT abdomen & pelvis with contrast   Final Result by Elidia Kern MD (05/02 0403)      No evidence of pulmonary embolus.      Mildly thickened and fluid-filled small bowel loops may represent nonspecific enteritis.      Circumferential wall thickening of the urinary bladder suggestive of cystitis. Correlate with urinalysis.      Heterogeneous endometrium which appears to contain a small hyperattenuating focus, incompletely evaluated but likely corresponding to suspected retained products of conception described on recent outside pelvic  ultrasound. Recommend continued follow-up.      Left ovarian simple cyst measuring 5.8 cm. Recommend pelvic ultrasound in 8 to 12 weeks.      The study was marked in EPIC for immediate notification.               Workstation performed: [id number]                    Procedures  ECG 12 Lead Documentation Only    Date/Time: 5/2/2024 12:20 AM    Performed by: MERI Evans  Authorized by: MERI Evans    Indications / Diagnosis:  Leg swelling  ECG reviewed by me, the ED Provider: yes    Patient location:  ED  Previous ECG:     Previous ECG:  Compared to current    Comparison ECG info:  7/24/20    Similarity:  No change    Comparison to cardiac monitor: Yes    Interpretation:     Interpretation: non-specific    Rate:     ECG rate:  74    ECG rate assessment: normal    Rhythm:     Rhythm: sinus rhythm    Ectopy:     Ectopy: none    QRS:     QRS axis:  Right    QRS intervals:  Normal  Conduction:     Conduction: normal    ST segments:     ST segments:  Normal  T waves:     T waves: normal             ED Course  ED Course as of 05/02/24 0512   Thu May 02, 2024   0210 Reviewed blood work -ordered CTA PE chest on pelvis.   0412 PE Study with CT abdomen & pelvis with contrast  No evidence of pulmonary embolus.     Mildly thickened and fluid-filled small bowel loops may represent nonspecific enteritis.     Circumferential wall thickening of the urinary bladder suggestive of cystitis. Correlate with urinalysis.     Heterogeneous endometrium which appears to contain a small hyperattenuating focus, incompletely evaluated but likely corresponding to suspected retained products of conception described on recent outside pelvic ultrasound. Recommend continued follow-up.     Left ovarian simple cyst measuring 5.8 cm. Recommend pelvic ultrasound in 8 to 12 weeks.                  HEART Risk Score      Flowsheet Row Most Recent Value   Heart Score Risk Calculator    History 0 Filed at: 05/02/2024 0400   ECG 0 Filed at: 05/02/2024  0400   Age 0 Filed at: 2024 040   Risk Factors 1 Filed at: 2024 040   Troponin 0 Filed at: 2024   HEART Score 1 Filed at: 2024                          SBIRT 22yo+      Flowsheet Row Most Recent Value   Initial Alcohol Screen: US AUDIT-C     1. How often do you have a drink containing alcohol? 0 Filed at: 2024 0300   2. How many drinks containing alcohol do you have on a typical day you are drinking?  0 Filed at: 2024 0300   3b. FEMALE Any Age, or MALE 65+: How often do you have 4 or more drinks on one occassion? 1 Filed at: 2024   Audit-C Score 1 Filed at: 2024   SARA: How many times in the past year have you...    Used an illegal drug or used a prescription medication for non-medical reasons? Never Filed at: 2024 030                      Medical Decision Making  Exam with trace edema of bilateral lower legs. No acute evidence of volume overload so doubt heart failure.  EKG without signs of active ischemia.  Troponin <2, delta, so doubt NSTEMI.  Presentation not consistent with acute PE, pneumothorax, pericarditis, tamponade, pneumonia.  Heart score: 1  CBC negative for leukocytosis, anemia.  CMP negative for metabolic derangements. Follow-up with primary care provider.  Patient stated that she is aware of retained product of conception and has D&C scheduled tomorrow. Patient has founds to have UTI. Abx sent to pharmacy. Return to ER symptoms worsens or questions or concerns arise at home.      Amount and/or Complexity of Data Reviewed  Labs: ordered.  Radiology: ordered. Decision-making details documented in ED Course.    Risk  Prescription drug management.             Disposition  Final diagnoses:   UTI (urinary tract infection)   Leg swelling   Ovarian cyst   Retained products of conception following      Time reflects when diagnosis was documented in both MDM as applicable and the Disposition within this note       Time User  Action Codes Description Comment    2024  4:12 AM Maria C Jones [N39.0] UTI (urinary tract infection)     2024  4:12 AM Maria C Jones [M79.89] Leg swelling     2024  4:13 AM Maria C Jones [N83.209] Ovarian cyst     2024  4:13 AM Maria C Jones [O03.4] Retained products of conception following            ED Disposition       ED Disposition   Discharge    Condition   Stable    Date/Time   Thu May 2, 2024 0412    Comment   Shirley Barton discharge to home/self care.                   Follow-up Information       Follow up With Specialties Details Why Contact Info Additional Information    Antoine Bob MD Internal Medicine   [address] [facility] [address] [phone]       [facility] Emergency Department Emergency Medicine   [address] [place] [id number] [phone] [facility] Emergency Department, [address] [place] [address], [id number]            Discharge Medication List as of 2024  4:38 AM        START taking these medications    Details   cefuroxime (CEFTIN) 500 mg tablet Take 1 tablet (500 mg total) by mouth every 12 (twelve) hours for 7 days, Starting Thu 2024, Until Thu 2024, Normal             No discharge procedures on file.    PDMP Review       None            ED Provider  Electronically Signed by             MERI Evans  24 0512

## 2024-05-04 LAB — BACTERIA UR CULT: ABNORMAL

## 2024-05-08 LAB
ALBUMIN SERPL BCP-MCNC: 4 G/DL (ref 3.5–5)
ALP SERPL-CCNC: 64 U/L (ref 34–104)
ALT SERPL W P-5'-P-CCNC: 23 U/L (ref 7–52)
ANION GAP SERPL CALCULATED.3IONS-SCNC: 7 MMOL/L (ref 4–13)
AST SERPL W P-5'-P-CCNC: 26 U/L (ref 13–39)
BILIRUB SERPL-MCNC: 0.33 MG/DL (ref 0.2–1)
BUN SERPL-MCNC: 15 MG/DL (ref 5–25)
CALCIUM SERPL-MCNC: 9.1 MG/DL (ref 8.4–10.2)
CHLORIDE SERPL-SCNC: 107 MMOL/L (ref 96–108)
CO2 SERPL-SCNC: 25 MMOL/L (ref 21–32)
CREAT SERPL-MCNC: 0.86 MG/DL (ref 0.6–1.3)
GFR SERPL CREATININE-BSD FRML MDRD: 87 ML/MIN/1.73SQ M
GLUCOSE SERPL-MCNC: 81 MG/DL (ref 65–140)
POTASSIUM SERPL-SCNC: 3.9 MMOL/L (ref 3.5–5.3)
PROT SERPL-MCNC: 6.5 G/DL (ref 6.4–8.4)
SODIUM SERPL-SCNC: 139 MMOL/L (ref 135–147)

## 2024-07-10 ENCOUNTER — OFFICE VISIT (OUTPATIENT)
Dept: INTERNAL MEDICINE CLINIC | Facility: CLINIC | Age: 39
End: 2024-07-10
Payer: COMMERCIAL

## 2024-07-10 VITALS
DIASTOLIC BLOOD PRESSURE: 62 MMHG | RESPIRATION RATE: 16 BRPM | SYSTOLIC BLOOD PRESSURE: 94 MMHG | HEART RATE: 61 BPM | BODY MASS INDEX: 30.02 KG/M2 | WEIGHT: 159 LBS | HEIGHT: 61 IN | OXYGEN SATURATION: 99 %

## 2024-07-10 DIAGNOSIS — F41.9 ANXIETY: ICD-10-CM

## 2024-07-10 DIAGNOSIS — Z13.6 SCREENING FOR HEART DISEASE: ICD-10-CM

## 2024-07-10 DIAGNOSIS — R10.31 RLQ ABDOMINAL PAIN: Primary | ICD-10-CM

## 2024-07-10 PROCEDURE — 99203 OFFICE O/P NEW LOW 30 MIN: CPT | Performed by: PHYSICIAN ASSISTANT

## 2024-07-10 RX ORDER — HYDROXYZINE HYDROCHLORIDE 25 MG/1
25 TABLET, FILM COATED ORAL EVERY 6 HOURS PRN
Qty: 30 TABLET | Refills: 0 | Status: SHIPPED | OUTPATIENT
Start: 2024-07-10

## 2024-07-10 RX ORDER — NORELGESTROMIN AND ETHINYL ESTRADIOL 35; 150 UG/MG; UG/MG
1 PATCH TRANSDERMAL WEEKLY
COMMUNITY
Start: 2024-04-05 | End: 2025-04-05

## 2024-07-10 NOTE — PROGRESS NOTES
Assessment/Plan:   Patient Instructions   General medical exam is good.  Suspect previous abdominal pain may have been coming from a kidney stone.  Will have patient do labs and return in 1 month for follow-up visit.  Will give trial of hydroxyzine 25 mg as needed for anxiety.     Quality Measures:   Depression Screening and Follow-up Plan: Patient was screened for depression during today's encounter. They screened negative with a PHQ-2 score of 0.         Return in about 4 weeks (around 8/7/2024) for Next scheduled follow up.         Diagnoses and all orders for this visit:    RLQ abdominal pain  -     CBC and differential; Future  -     Comprehensive metabolic panel; Future  -     Urinalysis with microscopic; Future  -     CBC and differential  -     Comprehensive metabolic panel  -     Urinalysis with microscopic    Anxiety  -     hydrOXYzine HCL (ATARAX) 25 mg tablet; Take 1 tablet (25 mg total) by mouth every 6 (six) hours as needed for itching    Screening for heart disease  -     Lipid panel; Future  -     Lipid panel    Other orders  -     norelgestromin-ethinyl estradiol (ORTHO EVRA) 150-35 MCG/24HR; Place 1 patch on the skin once a week (Patient not taking: Reported on 7/10/2024)          Subjective:      Patient ID: Shirley Barton is a 38 y.o. female.    38-year-old female presents to establish with this practice and for health assessment.    She has had recent problem with right lower quadrant abdominal pain that was fully evaluated by her gynecologist without findings from gynecology point of view.  Patient has CT scans in the EMR but do not indicate any evidence of kidney stones however patient does have past history of having kidney stone.  Her clinical history is suggestive of this.  Urinalysis did show some microscopic hematuria and pyuria.  She at this time states the abdominal pain is improved.  At 1 point it seemed to just spontaneously improved.    Also describing awakening at night with feeling of  shortness of breath and palpitations.  Anxiety level is high.  She describes it as a 7 out of 10.  Has long history of anxiety.  EMR indicates she has tried several antidepressants that she did not like the way they made her feel.  Unemployed single mother of 10-year-old twins.    EMR has been reviewed, clarified, and updated with patient today.        ALLERGIES:  Allergies   Allergen Reactions    Pollen Extract Sneezing       CURRENT MEDICATIONS:    Current Outpatient Medications:     hydrOXYzine HCL (ATARAX) 25 mg tablet, Take 1 tablet (25 mg total) by mouth every 6 (six) hours as needed for itching, Disp: 30 tablet, Rfl: 0    norelgestromin-ethinyl estradiol (ORTHO EVRA) 150-35 MCG/24HR, Place 1 patch on the skin once a week (Patient not taking: Reported on 7/10/2024), Disp: , Rfl:     ACTIVE PROBLEM LIST:  Patient Active Problem List   Diagnosis    Lumbar strain       PAST MEDICAL HISTORY:  Past Medical History:   Diagnosis Date    Anxiety        PAST SURGICAL HISTORY:  Past Surgical History:   Procedure Laterality Date    BREAST SURGERY       SECTION      GASTRIC BYPASS      LIPOSUCTION         FAMILY HISTORY:  Family History   Problem Relation Age of Onset    Thyroid disease Mother     Cancer Father         lungs    Heart disease Maternal Grandfather     Colon cancer Paternal Grandfather     Diabetes Paternal Grandfather     Heart disease Paternal Grandfather     Breast cancer Neg Hx     Prostate cancer Neg Hx        SOCIAL HISTORY:  Social History     Socioeconomic History    Marital status: Single     Spouse name: Not on file    Number of children: Not on file    Years of education: Not on file    Highest education level: Not on file   Occupational History    Not on file   Tobacco Use    Smoking status: Never    Smokeless tobacco: Never   Vaping Use    Vaping status: Never Used   Substance and Sexual Activity    Alcohol use: No    Drug use: No    Sexual activity: Not Currently     Partners: Male      "Birth control/protection: Patch, Condom Male   Other Topics Concern    Not on file   Social History Narrative    Single mom of twins    Brushes teeth twice daily    Currently unemployed     Social Determinants of Health     Financial Resource Strain: Not on file   Food Insecurity: Not on file   Transportation Needs: Not on file   Physical Activity: Not on file   Stress: Not on file   Social Connections: Unknown (6/18/2024)    Received from [employer]     How often do you feel lonely or isolated from those around you? (Adult - for ages 18 years and over): Not on file   Intimate Partner Violence: Not on file   Housing Stability: Not on file       Review of Systems   Constitutional:  Negative for activity change, chills, fatigue and fever.   HENT:  Negative for congestion.    Eyes:  Negative for discharge.   Respiratory:  Negative for cough, chest tightness and shortness of breath.    Cardiovascular:  Negative for chest pain, palpitations and leg swelling.   Gastrointestinal:  Positive for abdominal pain. Negative for blood in stool, constipation, diarrhea, nausea and vomiting.   Endocrine: Negative for polydipsia, polyphagia and polyuria.   Genitourinary:  Negative for difficulty urinating.   Musculoskeletal:  Negative for arthralgias and myalgias.   Skin:  Negative for rash.   Allergic/Immunologic: Negative for immunocompromised state.   Neurological:  Negative for dizziness, syncope, weakness, light-headedness and headaches.   Hematological:  Negative for adenopathy. Does not bruise/bleed easily.   Psychiatric/Behavioral:  Negative for dysphoric mood, sleep disturbance and suicidal ideas. The patient is nervous/anxious.          Objective:  Vitals:    07/10/24 1430   BP: 94/62   BP Location: Left arm   Patient Position: Sitting   Cuff Size: Adult   Pulse: 61   Resp: 16   SpO2: 99%   Weight: 72.1 kg (159 lb)   Height: 5' 1\" (1.549 m)     Body mass index is 30.04 kg/m².     Physical Exam  Vitals " and nursing note reviewed.   Constitutional:       General: She is not in acute distress.     Appearance: She is well-developed. She is not ill-appearing.      Comments: Developed, well-nourished 38-year-old female appearing about stated age in no acute distress.   HENT:      Head: Normocephalic and atraumatic.      Right Ear: Tympanic membrane, ear canal and external ear normal.      Left Ear: Tympanic membrane, ear canal and external ear normal.      Nose: Nose normal.      Mouth/Throat:      Mouth: Mucous membranes are moist.      Pharynx: Oropharynx is clear.   Eyes:      General: No scleral icterus.        Right eye: No discharge.         Left eye: No discharge.      Extraocular Movements: Extraocular movements intact.      Conjunctiva/sclera: Conjunctivae normal.      Pupils: Pupils are equal, round, and reactive to light.   Neck:      Thyroid: No thyromegaly.      Vascular: No carotid bruit.   Cardiovascular:      Rate and Rhythm: Normal rate and regular rhythm.      Pulses: Normal pulses.      Heart sounds: Normal heart sounds. No murmur heard.  Pulmonary:      Effort: Pulmonary effort is normal. No respiratory distress.      Breath sounds: Normal breath sounds.   Chest:      Chest wall: No tenderness.   Abdominal:      General: Abdomen is flat. Bowel sounds are normal. There is no distension.      Palpations: Abdomen is soft. There is no hepatomegaly, splenomegaly or mass.      Tenderness: There is no abdominal tenderness. There is no right CVA tenderness, left CVA tenderness, guarding or rebound.   Musculoskeletal:         General: No swelling. Normal range of motion.      Cervical back: Normal range of motion and neck supple.      Right lower leg: No edema.      Left lower leg: No edema.   Lymphadenopathy:      Cervical: No cervical adenopathy.   Skin:     General: Skin is warm and dry.      Findings: No rash.   Neurological:      General: No focal deficit present.      Mental Status: She is alert and  oriented to person, place, and time.      Cranial Nerves: No cranial nerve deficit.      Sensory: No sensory deficit.      Motor: No weakness.      Coordination: Coordination normal.      Gait: Gait normal.      Deep Tendon Reflexes: Reflexes are normal and symmetric. Reflexes normal.   Psychiatric:         Mood and Affect: Mood normal.         Behavior: Behavior normal.         Thought Content: Thought content normal.         Judgment: Judgment normal.           RESULTS:  Hemoglobin A1C   Date/Time Value Ref Range Status   10/19/2023 08:41 AM 4.7 <5.7 % Final     Comment:     Reference Range  Non-diabetic                     <5.7  Pre-diabetic                     5.7-6.4  Diabetic                         >=6.5  ADA target for diabetic control  <=7     Hemoglobin   Date/Time Value Ref Range Status   05/02/2024 12:06 AM 13.1 11.5 - 15.4 g/dL Final   01/28/2014 05:37 AM 10.9 (L) 11.5 - 15.4 g/dL Final     Hematocrit   Date/Time Value Ref Range Status   05/02/2024 12:06 AM 40.7 34.8 - 46.1 % Final   01/28/2014 05:37 AM 32.3 (L) 34.8 - 46.1 % Final     Platelets   Date/Time Value Ref Range Status   05/02/2024 12:06  149 - 390 Thousands/uL Final   01/28/2014 05:37  (L) 149 - 390 Thousand/uL Final     Sodium   Date/Time Value Ref Range Status   05/02/2024 01:26  135 - 147 mmol/L Final   10/19/2023 08:41  135 - 145 mmol/L Final     BUN   Date/Time Value Ref Range Status   05/02/2024 01:26 AM 15 5 - 25 mg/dL Final   10/19/2023 08:41 AM 10 7 - 25 mg/dL Final     Creatinine   Date/Time Value Ref Range Status   05/02/2024 01:26 AM 0.86 0.60 - 1.30 mg/dL Final     Comment:     Standardized to IDMS reference method   10/19/2023 08:41 AM 0.90 0.40 - 1.10 mg/dL Final      In chart    This note was created with voice recognition software.  Phonic, grammatical and spelling errors may be present within the note as a result.

## 2024-07-10 NOTE — PATIENT INSTRUCTIONS
General medical exam is good.  Suspect previous abdominal pain may have been coming from a kidney stone.  Will have patient do labs and return in 1 month for follow-up visit.  Will give trial of hydroxyzine 25 mg as needed for anxiety.

## 2024-09-09 ENCOUNTER — TELEPHONE (OUTPATIENT)
Age: 39
End: 2024-09-09

## 2024-09-09 NOTE — TELEPHONE ENCOUNTER
Shirley    Patient is asking for a call back, she had to cancel her appt for tomorrow 09/10 she didn't get her blood work done.    CB: [phone]

## 2024-11-13 ENCOUNTER — OFFICE VISIT (OUTPATIENT)
Dept: INTERNAL MEDICINE CLINIC | Facility: CLINIC | Age: 39
End: 2024-11-13
Payer: COMMERCIAL

## 2024-11-13 VITALS
SYSTOLIC BLOOD PRESSURE: 124 MMHG | HEART RATE: 75 BPM | DIASTOLIC BLOOD PRESSURE: 80 MMHG | BODY MASS INDEX: 26.06 KG/M2 | WEIGHT: 138 LBS | HEIGHT: 61 IN

## 2024-11-13 DIAGNOSIS — G89.29 CHRONIC RIGHT-SIDED LOW BACK PAIN WITH RIGHT-SIDED SCIATICA: Primary | ICD-10-CM

## 2024-11-13 DIAGNOSIS — M54.41 CHRONIC RIGHT-SIDED LOW BACK PAIN WITH RIGHT-SIDED SCIATICA: Primary | ICD-10-CM

## 2024-11-13 DIAGNOSIS — M54.16 LUMBAR RADICULOPATHY: ICD-10-CM

## 2024-11-13 PROCEDURE — 99213 OFFICE O/P EST LOW 20 MIN: CPT | Performed by: PHYSICIAN ASSISTANT

## 2024-11-13 NOTE — PATIENT INSTRUCTIONS
Have lab work done that is in the EMR dated July for our follow-up visit in 1 month.  Have x-ray of lumbar spine done and I will communicate the results to you when available.  We are going to start physical therapy for your low back pain that radiates into your right leg.  We will plan on getting together in 1 month to ascertain effectiveness of the physical therapy and proceed from there.

## 2024-11-13 NOTE — PROGRESS NOTES
Ambulatory Visit  Name: Shirley Barton      : 1985      MRN: [id number]  Encounter Provider: Jerrod Oliveros PA-C  Encounter Date: 2024   Encounter department: [facility] INTERNAL MEDICINE [place]    Assessment & Plan  Chronic right-sided low back pain with right-sided sciatica    Orders:    XR spine lumbar minimum 4 views non injury; Future    Ambulatory Referral to Physical Therapy; Future    Lumbar radiculopathy    Orders:    XR spine lumbar minimum 4 views non injury; Future    Ambulatory Referral to Physical Therapy; Future       History of Present Illness     Follow-up/acute visit    Patient did not have her labs done for this visit that were placed in July.  She will do so for follow-up visit.    Low back pain: Going on for at least 1 year.  Located right lower back radiating down posterior leg to her toes.  Initially when it started she had difficulty doing anything including walking sitting standing etc.  No bowel or bladder dysfunction.  It has now been intermittent but still present.  It does affect her ADLs and that she cannot stand longer than 15 minutes, she cannot sit on a soft surface where the pain will occur, she cannot lie comfortably on her back without the pain.  If she sits on a hard surface and then stands up she will have the pain occur immediately.  She has sought care with a chiropractor however did not like his approach.  She is willing to have x-rays done at this point and start physical therapy.          Review of Systems   Constitutional:  Negative for activity change, chills, fatigue and fever.   HENT:  Negative for congestion.    Eyes:  Negative for discharge.   Respiratory:  Negative for cough, chest tightness and shortness of breath.    Cardiovascular:  Negative for chest pain, palpitations and leg swelling.   Gastrointestinal:  Negative for abdominal pain.   Genitourinary:  Negative for difficulty urinating.   Musculoskeletal:  Positive for back pain and gait  "problem. Negative for arthralgias and myalgias.   Skin:  Negative for rash.   Allergic/Immunologic: Negative for immunocompromised state.   Neurological:  Negative for dizziness, syncope, weakness, light-headedness and headaches.   Hematological:  Negative for adenopathy. Does not bruise/bleed easily.   Psychiatric/Behavioral:  Negative for dysphoric mood. The patient is not nervous/anxious.            Objective     /80 (BP Location: Right arm, Patient Position: Sitting, Cuff Size: Standard)   Pulse 75   Ht 5' 1\" (1.549 m)   Wt 62.6 kg (138 lb)   BMI 26.07 kg/m²     Physical Exam  Constitutional:       General: She is not in acute distress.     Appearance: Normal appearance.   HENT:      Head: Normocephalic.   Eyes:      Pupils: Pupils are equal, round, and reactive to light.   Neck:      Thyroid: No thyromegaly.      Vascular: No carotid bruit.      Trachea: Trachea normal.   Cardiovascular:      Rate and Rhythm: Normal rate and regular rhythm.      Heart sounds: No murmur heard.  Pulmonary:      Effort: Pulmonary effort is normal. No respiratory distress.      Breath sounds: Normal breath sounds.   Musculoskeletal:         General: No swelling.      Cervical back: Neck supple.      Right lower leg: No edema.      Left lower leg: No edema.      Comments: Back exam: Positive straight leg raising on the right at 30 degrees.  Positive pain on active extension of right leg.  Strength and reflexes at this time are intact.  Pain on extension of spine, right rotation, right lateral flexion.   Lymphadenopathy:      Cervical: No cervical adenopathy.   Skin:     General: Skin is warm and dry.      Findings: No rash.   Neurological:      General: No focal deficit present.      Mental Status: She is alert and oriented to person, place, and time. Mental status is at baseline.   Psychiatric:         Mood and Affect: Mood normal.         Behavior: Behavior normal.         "

## 2024-11-21 ENCOUNTER — APPOINTMENT (OUTPATIENT)
Age: 39
End: 2024-11-21
Payer: COMMERCIAL

## 2024-11-21 ENCOUNTER — RESULTS FOLLOW-UP (OUTPATIENT)
Dept: INTERNAL MEDICINE CLINIC | Facility: CLINIC | Age: 39
End: 2024-11-21

## 2024-11-21 LAB
ALBUMIN SERPL BCG-MCNC: 4.5 G/DL (ref 3.5–5)
ALP SERPL-CCNC: 58 U/L (ref 34–104)
ALT SERPL W P-5'-P-CCNC: 17 U/L (ref 7–52)
ANION GAP SERPL CALCULATED.3IONS-SCNC: 10 MMOL/L (ref 4–13)
AST SERPL W P-5'-P-CCNC: 23 U/L (ref 13–39)
BACTERIA UR QL AUTO: ABNORMAL /HPF
BASOPHILS # BLD AUTO: 0.11 THOUSANDS/ÂΜL (ref 0–0.1)
BASOPHILS NFR BLD AUTO: 1 % (ref 0–1)
BILIRUB SERPL-MCNC: 0.46 MG/DL (ref 0.2–1)
BILIRUB UR QL STRIP: NEGATIVE
BUN SERPL-MCNC: 13 MG/DL (ref 5–25)
CALCIUM SERPL-MCNC: 9.2 MG/DL (ref 8.4–10.2)
CHLORIDE SERPL-SCNC: 101 MMOL/L (ref 96–108)
CHOLEST SERPL-MCNC: 189 MG/DL (ref ?–200)
CLARITY UR: CLEAR
CO2 SERPL-SCNC: 25 MMOL/L (ref 21–32)
COLOR UR: COLORLESS
CREAT SERPL-MCNC: 0.89 MG/DL (ref 0.6–1.3)
EOSINOPHIL # BLD AUTO: 0.14 THOUSAND/ÂΜL (ref 0–0.61)
EOSINOPHIL NFR BLD AUTO: 2 % (ref 0–6)
ERYTHROCYTE [DISTWIDTH] IN BLOOD BY AUTOMATED COUNT: 17.2 % (ref 11.6–15.1)
GFR SERPL CREATININE-BSD FRML MDRD: 83 ML/MIN/1.73SQ M
GLUCOSE P FAST SERPL-MCNC: 85 MG/DL (ref 65–99)
GLUCOSE UR STRIP-MCNC: NEGATIVE MG/DL
HCT VFR BLD AUTO: 40.5 % (ref 34.8–46.1)
HDLC SERPL-MCNC: 84 MG/DL
HGB BLD-MCNC: 13 G/DL (ref 11.5–15.4)
HGB UR QL STRIP.AUTO: NEGATIVE
IMM GRANULOCYTES # BLD AUTO: 0.02 THOUSAND/UL (ref 0–0.2)
IMM GRANULOCYTES NFR BLD AUTO: 0 % (ref 0–2)
KETONES UR STRIP-MCNC: NEGATIVE MG/DL
LDLC SERPL CALC-MCNC: 86 MG/DL (ref 0–100)
LEUKOCYTE ESTERASE UR QL STRIP: NEGATIVE
LYMPHOCYTES # BLD AUTO: 3.19 THOUSANDS/ÂΜL (ref 0.6–4.47)
LYMPHOCYTES NFR BLD AUTO: 36 % (ref 14–44)
MCH RBC QN AUTO: 27.1 PG (ref 26.8–34.3)
MCHC RBC AUTO-ENTMCNC: 32.1 G/DL (ref 31.4–37.4)
MCV RBC AUTO: 84 FL (ref 82–98)
MONOCYTES # BLD AUTO: 0.63 THOUSAND/ÂΜL (ref 0.17–1.22)
MONOCYTES NFR BLD AUTO: 7 % (ref 4–12)
NEUTROPHILS # BLD AUTO: 4.83 THOUSANDS/ÂΜL (ref 1.85–7.62)
NEUTS SEG NFR BLD AUTO: 54 % (ref 43–75)
NITRITE UR QL STRIP: NEGATIVE
NON-SQ EPI CELLS URNS QL MICRO: ABNORMAL /HPF
NONHDLC SERPL-MCNC: 105 MG/DL
NRBC BLD AUTO-RTO: 0 /100 WBCS
PH UR STRIP.AUTO: 6.5 [PH]
PLATELET # BLD AUTO: 244 THOUSANDS/UL (ref 149–390)
PMV BLD AUTO: 11.4 FL (ref 8.9–12.7)
POTASSIUM SERPL-SCNC: 4 MMOL/L (ref 3.5–5.3)
PROT SERPL-MCNC: 7.2 G/DL (ref 6.4–8.4)
PROT UR STRIP-MCNC: NEGATIVE MG/DL
RBC # BLD AUTO: 4.8 MILLION/UL (ref 3.81–5.12)
RBC #/AREA URNS AUTO: ABNORMAL /HPF
SODIUM SERPL-SCNC: 136 MMOL/L (ref 135–147)
SP GR UR STRIP.AUTO: 1 (ref 1–1.03)
TRIGL SERPL-MCNC: 96 MG/DL (ref ?–150)
UROBILINOGEN UR STRIP-ACNC: <2 MG/DL
WBC # BLD AUTO: 8.92 THOUSAND/UL (ref 4.31–10.16)
WBC #/AREA URNS AUTO: ABNORMAL /HPF

## 2025-01-24 ENCOUNTER — APPOINTMENT (EMERGENCY)
Dept: RADIOLOGY | Facility: HOSPITAL | Age: 40
DRG: 227 | End: 2025-01-24
Payer: COMMERCIAL

## 2025-01-24 ENCOUNTER — HOSPITAL ENCOUNTER (INPATIENT)
Facility: HOSPITAL | Age: 40
LOS: 1 days | Discharge: HOME | DRG: 227 | End: 2025-01-25
Admitting: SURGERY
Payer: COMMERCIAL

## 2025-01-24 ENCOUNTER — APPOINTMENT (EMERGENCY)
Dept: CT IMAGING | Facility: HOSPITAL | Age: 40
DRG: 227 | End: 2025-01-24
Payer: COMMERCIAL

## 2025-01-24 DIAGNOSIS — R10.9 ABDOMINAL PAIN: ICD-10-CM

## 2025-01-24 DIAGNOSIS — K45.8 INTERNAL HERNIA: Primary | ICD-10-CM

## 2025-01-24 LAB
ALBUMIN SERPL BCG-MCNC: 4.3 G/DL (ref 3.5–5)
ALP SERPL-CCNC: 53 U/L (ref 34–104)
ALT SERPL W P-5'-P-CCNC: 22 U/L (ref 7–52)
ANION GAP SERPL CALCULATED.3IONS-SCNC: 11 MMOL/L (ref 4–13)
AST SERPL W P-5'-P-CCNC: 25 U/L (ref 13–39)
B-HCG SERPL-ACNC: <0.6 MIU/ML (ref 0–5)
BASOPHILS # BLD AUTO: 0.13 THOUSANDS/ΜL (ref 0–0.1)
BASOPHILS NFR BLD AUTO: 1 % (ref 0–1)
BILIRUB SERPL-MCNC: 0.35 MG/DL (ref 0.2–1)
BUN SERPL-MCNC: 13 MG/DL (ref 5–25)
CALCIUM SERPL-MCNC: 9 MG/DL (ref 8.4–10.2)
CHLORIDE SERPL-SCNC: 103 MMOL/L (ref 96–108)
CO2 SERPL-SCNC: 21 MMOL/L (ref 21–32)
CREAT SERPL-MCNC: 0.82 MG/DL (ref 0.6–1.3)
EOSINOPHIL # BLD AUTO: 0.14 THOUSAND/ΜL (ref 0–0.61)
EOSINOPHIL NFR BLD AUTO: 1 % (ref 0–6)
ERYTHROCYTE [DISTWIDTH] IN BLOOD BY AUTOMATED COUNT: 16 % (ref 11.6–15.1)
GFR SERPL CREATININE-BSD FRML MDRD: 91 ML/MIN/1.73SQ M
GLUCOSE SERPL-MCNC: 123 MG/DL (ref 65–140)
HCT VFR BLD AUTO: 32.5 % (ref 34.8–46.1)
HGB BLD-MCNC: 10.5 G/DL (ref 11.5–15.4)
IMM GRANULOCYTES # BLD AUTO: 0.04 THOUSAND/UL (ref 0–0.2)
IMM GRANULOCYTES NFR BLD AUTO: 0 % (ref 0–2)
LIPASE SERPL-CCNC: 46 U/L (ref 11–82)
LYMPHOCYTES # BLD AUTO: 4.32 THOUSANDS/ΜL (ref 0.6–4.47)
LYMPHOCYTES NFR BLD AUTO: 31 % (ref 14–44)
MCH RBC QN AUTO: 25.7 PG (ref 26.8–34.3)
MCHC RBC AUTO-ENTMCNC: 32.3 G/DL (ref 31.4–37.4)
MCV RBC AUTO: 80 FL (ref 82–98)
MONOCYTES # BLD AUTO: 1.22 THOUSAND/ΜL (ref 0.17–1.22)
MONOCYTES NFR BLD AUTO: 9 % (ref 4–12)
NEUTROPHILS # BLD AUTO: 8.03 THOUSANDS/ΜL (ref 1.85–7.62)
NEUTS SEG NFR BLD AUTO: 58 % (ref 43–75)
NRBC BLD AUTO-RTO: 0 /100 WBCS
PLATELET # BLD AUTO: 316 THOUSANDS/UL (ref 149–390)
PMV BLD AUTO: 10.3 FL (ref 8.9–12.7)
POTASSIUM SERPL-SCNC: 3.5 MMOL/L (ref 3.5–5.3)
PROT SERPL-MCNC: 6.7 G/DL (ref 6.4–8.4)
RBC # BLD AUTO: 4.08 MILLION/UL (ref 3.81–5.12)
SODIUM SERPL-SCNC: 135 MMOL/L (ref 135–147)
WBC # BLD AUTO: 13.88 THOUSAND/UL (ref 4.31–10.16)

## 2025-01-24 PROCEDURE — 96375 TX/PRO/DX INJ NEW DRUG ADDON: CPT

## 2025-01-24 PROCEDURE — 36415 COLL VENOUS BLD VENIPUNCTURE: CPT

## 2025-01-24 PROCEDURE — 71275 CT ANGIOGRAPHY CHEST: CPT

## 2025-01-24 PROCEDURE — 85025 COMPLETE CBC W/AUTO DIFF WBC: CPT

## 2025-01-24 PROCEDURE — 93005 ELECTROCARDIOGRAM TRACING: CPT

## 2025-01-24 PROCEDURE — 71045 X-RAY EXAM CHEST 1 VIEW: CPT

## 2025-01-24 PROCEDURE — 99285 EMERGENCY DEPT VISIT HI MDM: CPT

## 2025-01-24 PROCEDURE — 96361 HYDRATE IV INFUSION ADD-ON: CPT

## 2025-01-24 PROCEDURE — 74174 CTA ABD&PLVS W/CONTRAST: CPT

## 2025-01-24 PROCEDURE — 96365 THER/PROPH/DIAG IV INF INIT: CPT

## 2025-01-24 PROCEDURE — 96376 TX/PRO/DX INJ SAME DRUG ADON: CPT

## 2025-01-24 PROCEDURE — 80053 COMPREHEN METABOLIC PANEL: CPT

## 2025-01-24 PROCEDURE — 84702 CHORIONIC GONADOTROPIN TEST: CPT

## 2025-01-24 PROCEDURE — 83690 ASSAY OF LIPASE: CPT

## 2025-01-24 RX ORDER — PANTOPRAZOLE SODIUM 40 MG/10ML
40 INJECTION, POWDER, LYOPHILIZED, FOR SOLUTION INTRAVENOUS ONCE
Status: COMPLETED | OUTPATIENT
Start: 2025-01-24 | End: 2025-01-24

## 2025-01-24 RX ORDER — FENTANYL CITRATE 50 UG/ML
50 INJECTION, SOLUTION INTRAMUSCULAR; INTRAVENOUS ONCE
Refills: 0 | Status: COMPLETED | OUTPATIENT
Start: 2025-01-24 | End: 2025-01-24

## 2025-01-24 RX ORDER — LIDOCAINE HYDROCHLORIDE 20 MG/ML
1 JELLY TOPICAL ONCE
Status: COMPLETED | OUTPATIENT
Start: 2025-01-24 | End: 2025-01-24

## 2025-01-24 RX ORDER — ONDANSETRON 2 MG/ML
4 INJECTION INTRAMUSCULAR; INTRAVENOUS ONCE
Status: COMPLETED | OUTPATIENT
Start: 2025-01-24 | End: 2025-01-24

## 2025-01-24 RX ADMIN — FENTANYL CITRATE 50 MCG: 0.05 INJECTION, SOLUTION INTRAMUSCULAR; INTRAVENOUS at 22:36

## 2025-01-24 RX ADMIN — PANTOPRAZOLE SODIUM 40 MG: 40 INJECTION, POWDER, FOR SOLUTION INTRAVENOUS at 20:05

## 2025-01-24 RX ADMIN — FENTANYL CITRATE 50 MCG: 0.05 INJECTION, SOLUTION INTRAMUSCULAR; INTRAVENOUS at 20:05

## 2025-01-24 RX ADMIN — LIDOCAINE HYDROCHLORIDE 1 APPLICATION: 20 JELLY TOPICAL at 22:34

## 2025-01-24 RX ADMIN — SODIUM CHLORIDE 1000 ML: 0.9 INJECTION, SOLUTION INTRAVENOUS at 20:06

## 2025-01-24 RX ADMIN — IOHEXOL 100 ML: 350 INJECTION, SOLUTION INTRAVENOUS at 20:53

## 2025-01-24 RX ADMIN — SODIUM CHLORIDE, SODIUM LACTATE, POTASSIUM CHLORIDE, AND CALCIUM CHLORIDE 1000 ML: .6; .31; .03; .02 INJECTION, SOLUTION INTRAVENOUS at 23:50

## 2025-01-24 RX ADMIN — FENTANYL CITRATE 50 MCG: 0.05 INJECTION, SOLUTION INTRAMUSCULAR; INTRAVENOUS at 20:42

## 2025-01-24 RX ADMIN — ONDANSETRON 4 MG: 2 INJECTION INTRAMUSCULAR; INTRAVENOUS at 20:05

## 2025-01-25 ENCOUNTER — ANESTHESIA EVENT (EMERGENCY)
Dept: PERIOP | Facility: HOSPITAL | Age: 40
DRG: 227 | End: 2025-01-25
Payer: COMMERCIAL

## 2025-01-25 ENCOUNTER — ANESTHESIA (EMERGENCY)
Dept: PERIOP | Facility: HOSPITAL | Age: 40
DRG: 227 | End: 2025-01-25
Payer: COMMERCIAL

## 2025-01-25 VITALS
SYSTOLIC BLOOD PRESSURE: 109 MMHG | HEIGHT: 61 IN | HEART RATE: 92 BPM | RESPIRATION RATE: 17 BRPM | WEIGHT: 149.69 LBS | TEMPERATURE: 98.6 F | DIASTOLIC BLOOD PRESSURE: 69 MMHG | BODY MASS INDEX: 28.26 KG/M2 | OXYGEN SATURATION: 97 %

## 2025-01-25 PROBLEM — R10.9 ABDOMINAL PAIN: Status: ACTIVE | Noted: 2025-01-25

## 2025-01-25 LAB
ANION GAP SERPL CALCULATED.3IONS-SCNC: 8 MMOL/L (ref 4–13)
ATRIAL RATE: 88 BPM
BUN SERPL-MCNC: 11 MG/DL (ref 5–25)
CALCIUM SERPL-MCNC: 8.5 MG/DL (ref 8.4–10.2)
CHLORIDE SERPL-SCNC: 103 MMOL/L (ref 96–108)
CO2 SERPL-SCNC: 23 MMOL/L (ref 21–32)
CREAT SERPL-MCNC: 0.74 MG/DL (ref 0.6–1.3)
ERYTHROCYTE [DISTWIDTH] IN BLOOD BY AUTOMATED COUNT: 16.1 % (ref 11.6–15.1)
GFR SERPL CREATININE-BSD FRML MDRD: 103 ML/MIN/1.73SQ M
GLUCOSE SERPL-MCNC: 127 MG/DL (ref 65–140)
HCT VFR BLD AUTO: 31 % (ref 34.8–46.1)
HGB BLD-MCNC: 10 G/DL (ref 11.5–15.4)
MCH RBC QN AUTO: 25.6 PG (ref 26.8–34.3)
MCHC RBC AUTO-ENTMCNC: 32.3 G/DL (ref 31.4–37.4)
MCV RBC AUTO: 80 FL (ref 82–98)
P AXIS: 68 DEGREES
PLATELET # BLD AUTO: 244 THOUSANDS/UL (ref 149–390)
PMV BLD AUTO: 10.3 FL (ref 8.9–12.7)
POTASSIUM SERPL-SCNC: 3.9 MMOL/L (ref 3.5–5.3)
PR INTERVAL: 148 MS
QRS AXIS: 84 DEGREES
QRSD INTERVAL: 82 MS
QT INTERVAL: 366 MS
QTC INTERVAL: 442 MS
RBC # BLD AUTO: 3.9 MILLION/UL (ref 3.81–5.12)
SODIUM SERPL-SCNC: 134 MMOL/L (ref 135–147)
T WAVE AXIS: 68 DEGREES
VENTRICULAR RATE: 88 BPM
WBC # BLD AUTO: 13.27 THOUSAND/UL (ref 4.31–10.16)

## 2025-01-25 PROCEDURE — 93010 ELECTROCARDIOGRAM REPORT: CPT | Performed by: INTERNAL MEDICINE

## 2025-01-25 PROCEDURE — 0WQF4ZZ REPAIR ABDOMINAL WALL, PERCUTANEOUS ENDOSCOPIC APPROACH: ICD-10-PCS | Performed by: STUDENT IN AN ORGANIZED HEALTH CARE EDUCATION/TRAINING PROGRAM

## 2025-01-25 PROCEDURE — 85027 COMPLETE CBC AUTOMATED: CPT | Performed by: STUDENT IN AN ORGANIZED HEALTH CARE EDUCATION/TRAINING PROGRAM

## 2025-01-25 PROCEDURE — 80048 BASIC METABOLIC PNL TOTAL CA: CPT | Performed by: STUDENT IN AN ORGANIZED HEALTH CARE EDUCATION/TRAINING PROGRAM

## 2025-01-25 RX ORDER — PROPOFOL 10 MG/ML
INJECTION, EMULSION INTRAVENOUS AS NEEDED
Status: DISCONTINUED | OUTPATIENT
Start: 2025-01-25 | End: 2025-01-25

## 2025-01-25 RX ORDER — SIMETHICONE 80 MG
80 TABLET,CHEWABLE ORAL 4 TIMES DAILY PRN
Status: DISCONTINUED | OUTPATIENT
Start: 2025-01-25 | End: 2025-01-25 | Stop reason: HOSPADM

## 2025-01-25 RX ORDER — DIPHENHYDRAMINE HCL 25 MG
25 TABLET ORAL EVERY 8 HOURS PRN
Status: DISCONTINUED | OUTPATIENT
Start: 2025-01-25 | End: 2025-01-25 | Stop reason: HOSPADM

## 2025-01-25 RX ORDER — HEPARIN SODIUM 5000 [USP'U]/ML
5000 INJECTION, SOLUTION INTRAVENOUS; SUBCUTANEOUS ONCE
Status: CANCELLED | OUTPATIENT
Start: 2025-01-25 | End: 2025-01-25

## 2025-01-25 RX ORDER — ALBUTEROL SULFATE 0.83 MG/ML
2.5 SOLUTION RESPIRATORY (INHALATION) ONCE AS NEEDED
Status: DISCONTINUED | OUTPATIENT
Start: 2025-01-25 | End: 2025-01-25 | Stop reason: HOSPADM

## 2025-01-25 RX ORDER — SODIUM CHLORIDE, SODIUM LACTATE, POTASSIUM CHLORIDE, CALCIUM CHLORIDE 600; 310; 30; 20 MG/100ML; MG/100ML; MG/100ML; MG/100ML
100 INJECTION, SOLUTION INTRAVENOUS CONTINUOUS
Status: DISCONTINUED | OUTPATIENT
Start: 2025-01-25 | End: 2025-01-25 | Stop reason: HOSPADM

## 2025-01-25 RX ORDER — OXYCODONE HCL 5 MG/5 ML
5 SOLUTION, ORAL ORAL EVERY 4 HOURS PRN
Status: DISCONTINUED | OUTPATIENT
Start: 2025-01-25 | End: 2025-01-25 | Stop reason: HOSPADM

## 2025-01-25 RX ORDER — OXYCODONE HCL 5 MG/5 ML
10 SOLUTION, ORAL ORAL EVERY 4 HOURS PRN
Status: DISCONTINUED | OUTPATIENT
Start: 2025-01-25 | End: 2025-01-25 | Stop reason: HOSPADM

## 2025-01-25 RX ORDER — LIDOCAINE HYDROCHLORIDE 10 MG/ML
INJECTION, SOLUTION EPIDURAL; INFILTRATION; INTRACAUDAL; PERINEURAL AS NEEDED
Status: DISCONTINUED | OUTPATIENT
Start: 2025-01-25 | End: 2025-01-25

## 2025-01-25 RX ORDER — ACETAMINOPHEN 10 MG/ML
1000 INJECTION, SOLUTION INTRAVENOUS ONCE
Status: CANCELLED | OUTPATIENT
Start: 2025-01-25 | End: 2025-01-25

## 2025-01-25 RX ORDER — SODIUM CHLORIDE 9 MG/ML
INJECTION, SOLUTION INTRAVENOUS AS NEEDED
Status: DISCONTINUED | OUTPATIENT
Start: 2025-01-25 | End: 2025-01-25 | Stop reason: HOSPADM

## 2025-01-25 RX ORDER — MIDAZOLAM HYDROCHLORIDE 2 MG/2ML
INJECTION, SOLUTION INTRAMUSCULAR; INTRAVENOUS AS NEEDED
Status: DISCONTINUED | OUTPATIENT
Start: 2025-01-25 | End: 2025-01-25

## 2025-01-25 RX ORDER — ONDANSETRON 2 MG/ML
4 INJECTION INTRAMUSCULAR; INTRAVENOUS EVERY 6 HOURS PRN
Status: DISCONTINUED | OUTPATIENT
Start: 2025-01-25 | End: 2025-01-25 | Stop reason: HOSPADM

## 2025-01-25 RX ORDER — HEPARIN SODIUM 5000 [USP'U]/ML
INJECTION, SOLUTION INTRAVENOUS; SUBCUTANEOUS AS NEEDED
Status: DISCONTINUED | OUTPATIENT
Start: 2025-01-25 | End: 2025-01-25

## 2025-01-25 RX ORDER — FENTANYL CITRATE 50 UG/ML
INJECTION, SOLUTION INTRAMUSCULAR; INTRAVENOUS AS NEEDED
Status: DISCONTINUED | OUTPATIENT
Start: 2025-01-25 | End: 2025-01-25

## 2025-01-25 RX ORDER — CEFAZOLIN SODIUM 1 G/3ML
INJECTION, POWDER, FOR SOLUTION INTRAMUSCULAR; INTRAVENOUS AS NEEDED
Status: DISCONTINUED | OUTPATIENT
Start: 2025-01-25 | End: 2025-01-25

## 2025-01-25 RX ORDER — ROCURONIUM BROMIDE 10 MG/ML
INJECTION, SOLUTION INTRAVENOUS AS NEEDED
Status: DISCONTINUED | OUTPATIENT
Start: 2025-01-25 | End: 2025-01-25

## 2025-01-25 RX ORDER — FENTANYL CITRATE/PF 50 MCG/ML
25 SYRINGE (ML) INJECTION
Status: DISCONTINUED | OUTPATIENT
Start: 2025-01-25 | End: 2025-01-25 | Stop reason: HOSPADM

## 2025-01-25 RX ORDER — ACETAMINOPHEN 325 MG/1
650 TABLET ORAL EVERY 6 HOURS PRN
Status: DISCONTINUED | OUTPATIENT
Start: 2025-01-25 | End: 2025-01-25 | Stop reason: HOSPADM

## 2025-01-25 RX ORDER — KETOROLAC TROMETHAMINE 30 MG/ML
INJECTION, SOLUTION INTRAMUSCULAR; INTRAVENOUS AS NEEDED
Status: DISCONTINUED | OUTPATIENT
Start: 2025-01-25 | End: 2025-01-25

## 2025-01-25 RX ORDER — CEFAZOLIN SODIUM 2 G/50ML
2000 SOLUTION INTRAVENOUS ONCE
Status: CANCELLED | OUTPATIENT
Start: 2025-01-25 | End: 2025-01-25

## 2025-01-25 RX ORDER — ONDANSETRON 2 MG/ML
INJECTION INTRAMUSCULAR; INTRAVENOUS AS NEEDED
Status: DISCONTINUED | OUTPATIENT
Start: 2025-01-25 | End: 2025-01-25

## 2025-01-25 RX ORDER — SODIUM CHLORIDE, SODIUM LACTATE, POTASSIUM CHLORIDE, CALCIUM CHLORIDE 600; 310; 30; 20 MG/100ML; MG/100ML; MG/100ML; MG/100ML
INJECTION, SOLUTION INTRAVENOUS CONTINUOUS PRN
Status: DISCONTINUED | OUTPATIENT
Start: 2025-01-25 | End: 2025-01-25

## 2025-01-25 RX ORDER — CELECOXIB 100 MG/1
200 CAPSULE ORAL ONCE
Status: CANCELLED | OUTPATIENT
Start: 2025-01-25 | End: 2025-01-25

## 2025-01-25 RX ORDER — ONDANSETRON 2 MG/ML
4 INJECTION INTRAMUSCULAR; INTRAVENOUS ONCE AS NEEDED
Status: DISCONTINUED | OUTPATIENT
Start: 2025-01-25 | End: 2025-01-25 | Stop reason: HOSPADM

## 2025-01-25 RX ORDER — DEXAMETHASONE SODIUM PHOSPHATE 10 MG/ML
INJECTION, SOLUTION INTRAMUSCULAR; INTRAVENOUS AS NEEDED
Status: DISCONTINUED | OUTPATIENT
Start: 2025-01-25 | End: 2025-01-25

## 2025-01-25 RX ORDER — BUPIVACAINE HYDROCHLORIDE 5 MG/ML
INJECTION, SOLUTION EPIDURAL; INTRACAUDAL AS NEEDED
Status: DISCONTINUED | OUTPATIENT
Start: 2025-01-25 | End: 2025-01-25 | Stop reason: HOSPADM

## 2025-01-25 RX ORDER — HYDROMORPHONE HCL/PF 1 MG/ML
0.5 SYRINGE (ML) INJECTION
Status: DISCONTINUED | OUTPATIENT
Start: 2025-01-25 | End: 2025-01-25 | Stop reason: HOSPADM

## 2025-01-25 RX ORDER — FAMOTIDINE 10 MG/ML
20 INJECTION, SOLUTION INTRAVENOUS 2 TIMES DAILY
Status: DISCONTINUED | OUTPATIENT
Start: 2025-01-25 | End: 2025-01-25 | Stop reason: HOSPADM

## 2025-01-25 RX ADMIN — FENTANYL CITRATE 25 MCG: 0.05 INJECTION, SOLUTION INTRAMUSCULAR; INTRAVENOUS at 01:50

## 2025-01-25 RX ADMIN — HYDROMORPHONE HYDROCHLORIDE 0.5 MG: 1 INJECTION, SOLUTION INTRAMUSCULAR; INTRAVENOUS; SUBCUTANEOUS at 02:12

## 2025-01-25 RX ADMIN — FAMOTIDINE 20 MG: 10 INJECTION, SOLUTION INTRAVENOUS at 09:10

## 2025-01-25 RX ADMIN — MIDAZOLAM HYDROCHLORIDE 2 MG: 1 INJECTION, SOLUTION INTRAMUSCULAR; INTRAVENOUS at 00:25

## 2025-01-25 RX ADMIN — SODIUM CHLORIDE, SODIUM LACTATE, POTASSIUM CHLORIDE, AND CALCIUM CHLORIDE 100 ML/HR: .6; .31; .03; .02 INJECTION, SOLUTION INTRAVENOUS at 03:19

## 2025-01-25 RX ADMIN — SODIUM CHLORIDE, SODIUM LACTATE, POTASSIUM CHLORIDE, AND CALCIUM CHLORIDE: .6; .31; .03; .02 INJECTION, SOLUTION INTRAVENOUS at 00:11

## 2025-01-25 RX ADMIN — FENTANYL CITRATE 50 MCG: 50 INJECTION, SOLUTION INTRAMUSCULAR; INTRAVENOUS at 00:30

## 2025-01-25 RX ADMIN — FAMOTIDINE 20 MG: 10 INJECTION, SOLUTION INTRAVENOUS at 03:28

## 2025-01-25 RX ADMIN — SUGAMMADEX 200 MG: 100 INJECTION, SOLUTION INTRAVENOUS at 01:08

## 2025-01-25 RX ADMIN — FENTANYL CITRATE 50 MCG: 50 INJECTION, SOLUTION INTRAMUSCULAR; INTRAVENOUS at 01:03

## 2025-01-25 RX ADMIN — LIDOCAINE HYDROCHLORIDE 50 MG: 10 INJECTION, SOLUTION EPIDURAL; INFILTRATION; INTRACAUDAL; PERINEURAL at 00:30

## 2025-01-25 RX ADMIN — DEXAMETHASONE SODIUM PHOSPHATE 10 MG: 10 INJECTION INTRAMUSCULAR; INTRAVENOUS at 00:44

## 2025-01-25 RX ADMIN — FENTANYL CITRATE 50 MCG: 50 INJECTION, SOLUTION INTRAMUSCULAR; INTRAVENOUS at 01:18

## 2025-01-25 RX ADMIN — ROCURONIUM BROMIDE 50 MG: 10 INJECTION, SOLUTION INTRAVENOUS at 00:30

## 2025-01-25 RX ADMIN — HEPARIN SODIUM 5000 UNITS: 5000 INJECTION, SOLUTION INTRAVENOUS; SUBCUTANEOUS at 00:44

## 2025-01-25 RX ADMIN — FENTANYL CITRATE 25 MCG: 0.05 INJECTION, SOLUTION INTRAMUSCULAR; INTRAVENOUS at 02:00

## 2025-01-25 RX ADMIN — ONDANSETRON 4 MG: 2 INJECTION INTRAMUSCULAR; INTRAVENOUS at 01:07

## 2025-01-25 RX ADMIN — ONDANSETRON 4 MG: 2 INJECTION INTRAMUSCULAR; INTRAVENOUS at 03:19

## 2025-01-25 RX ADMIN — FENTANYL CITRATE 50 MCG: 50 INJECTION, SOLUTION INTRAMUSCULAR; INTRAVENOUS at 01:02

## 2025-01-25 RX ADMIN — KETOROLAC TROMETHAMINE 30 MG: 30 INJECTION, SOLUTION INTRAMUSCULAR; INTRAVENOUS at 01:07

## 2025-01-25 RX ADMIN — PROPOFOL 200 MG: 10 INJECTION, EMULSION INTRAVENOUS at 00:30

## 2025-01-25 RX ADMIN — CEFAZOLIN 2000 MG: 1 INJECTION, POWDER, FOR SOLUTION INTRAMUSCULAR; INTRAVENOUS at 00:44

## 2025-01-25 NOTE — DISCHARGE SUMMARY
Discharge Summary - Shirley Barton 39 y.o. female MRN: [id number]    Unit/Bed#: OR POOL Encounter: [id number]      Pre-Operative Diagnosis: Pre-Op Diagnosis Codes:      * Internal hernia [K45.8]    Post-Operative Diagnosis: Post-Op Diagnosis Codes:     * Internal hernia [K45.8]    Procedures Performed:  Procedure(s):  LAPAROSCOPY DIAGNOSTIC, LYSIS OF ADHESIONS    Surgeon: Nickolas Boogie MD    See H & P for full details of admission and Operative Note for full details of operations performed.   39 year old female who underwent bypass surgery approx 15 years ago in [place] Dr. Russell. CT with evidence of internal hernia. WBC elevated. Patient underwent above procedure.    Patient tolerated surgery well without complications. In the morning postoperative Day 1, the patient had mild nausea and abdominal pain. Tolerated a clear liquid diet without vomiting. Able to ambulate and voiding independently. Patient was deemed ready for discharge home.    Patient was seen and examined prior to discharge.      Provisions for Follow-Up Care:  See After Visit Summary/Discharge Instructions for information related to follow-up care and home orders.      Disposition: Home, in stable condition.     Planned Readmission: No    Discharge Medications:  See After Visit Summary/Discharge Instructions for reconciled discharge medications provided to patient and family.      Post Operative instructions: Reviewed with patient and/or family.    Signature:   Karson Jean-Baptiste MD  Date: 1/25/2025 Time: 1:47 AM

## 2025-01-25 NOTE — OP NOTE
OPERATIVE REPORT  PATIENT NAME: Shirley Barton    :  1985  MRN: [id number]  Pt Location: [facility] OR ROOM 04    SURGERY DATE: 2025    Surgeons and Role:     * Nickolas Boogie MD - Primary     * Karson Jean-Baptiste MD - Assisting    Preop Diagnosis:  Internal hernia [K45.8]    Post-Op Diagnosis Codes:     * Internal hernia [K45.8]    Procedure(s):  LAPAROSCOPY DIAGNOSTIC. LYSIS OF ADHESIONS    Specimen(s):  * No specimens in log *    Estimated Blood Loss:   Minimal    Drains:  * No LDAs found *    Anesthesia Type:   General    Operative Indications:  Internal hernia [K45.8]    Operative Findings:  Ascites fluid along the left colonic gutter  Delgadillo's and JJ defect was obliterated from initial surgery  Single adhesive band was taken down in the right upper quadrant.  There was a segment of dilated loop of bowel distal to the JJ anastomosis with congested mesentery, likely spontaneously reduced.    Complications:   None    Procedure and Technique:  The patient was brought to the operating room and placed in a supine position. The patient received a dose of IV antibiotics.  SCDs were placed.  The patient was induced under general endotracheal anesthesia. The abdominal wall was prepped and draped under sterile conditions in the usual fashion.     The procedure was started by obtaining access to the abdominal cavity using a Veress needle over palmers point. After that, the abdomen was entered with a periumbilical 10 mm trocar using an Optiview trocar under direct visualization. At that point, an 5-mm trocar and a 10-mm trocar were placed on the right side of the abdominal wall, under direct visualization. 20 ml of Exparel mixed with saline and 0.5 % Marcaine was used for a total of 55 ml of local.     Upon entering the abdomen we encountered a adhesive band stemming from the colonic mesentery attached to the abdominal wall in the right upper quadrant.  This was taken down using the harmonic scalpel.  There was ascites fluid  along the left paracolic gutter.    We ran the bowel from the terminal ileum proximally to the JJ anastomosis.  We then ran the bowel from the pouch down to the JJ anastomosis.  We then ran the bowel proximately from the JJ anastomosis to the ligament of Treitz.  There was no evidence of herniated bowel.  We evaluated both Petersons and JJ defect which were obliterated from the initial surgery.    Distal to the JJ anastomosis there was a clear segment of dilated bowel with congested mesentery.  At this point the bowel was evaluated and noted to be viable.  Most likely the segment of bowel spontaneously reduced.    The 10 mm periumbilical incision was closed using 0 Vicryl suture.    The skin was closed using 4-0 Monocryl and skin glue.    Dr. Devaughn Boogie was present for the entire procedure.    Patient Disposition:  PACU     This procedure was not performed to treat colon cancer through resection    SIGNATURE: Karson Jean-Baptiste MD  DATE: January 25, 2025  TIME: 1:28 AM

## 2025-01-25 NOTE — ED PROVIDER NOTES
Time reflects when diagnosis was documented in both MDM as applicable and the Disposition within this note       Time User Action Codes Description Comment    1/24/2025 10:15 PM Zoe Holm Add [K45.8] Internal hernia     1/24/2025 10:15 PM Zoe Holm Add [R10.9] Abdominal pain           ED Disposition       ED Disposition   Send to OR    Condition   --    Date/Time   Fri Jan 24, 2025 10:15 PM    Comment   --             Assessment & Plan       Medical Decision Making  39-year-old female presenting to the emergency department in severe distress    Differential includes DDx including but not limited to: appendicitis, gastroenteritis, gastritis, PUD, GERD, gastroparesis, hepatitis, pancreatitis, colitis, enteritis, food poisoning, mesenteric adenitis, IBD, IBS, ileus, bowel obstruction, intussusception, volvulus, cholecystitis, biliary colic, choledocholithiasis, perforated viscus, splenic etiology, constipation    Doubt ovarian torsion, no lower abdominal pain to palpation, pain reproducible w/ palpation of upper abdomen    Plan: bloodwork, imaging    Imaging returns with internal hernia.  Pt has hx bariatric surgery, she is unsure where it was performed, unsure when. Discussed with general surgery who recommends NG tube placement.  NG tube was placed by nursing staff.  Reach out to bariatrics who will take patient to OR tonight. Patient subsequently transferred to OR.       Amount and/or Complexity of Data Reviewed  Labs: ordered.  Radiology: ordered.    Risk  Prescription drug management.  Decision regarding hospitalization.        ED Course as of 01/27/25 1548   Fri Jan 24, 2025 2036 Pt notes negative pregnancy test this am. Cannot provide urine. Discussed risk of radiation to potential fetus, patient understands risk, is ok with Ct without prior pregnancy test.   2225 Discussed with general surgery, concern for internal hernia with potential vascular compromise.  Surgery coming into see, plan to  take patient to operating room as soon as possible.   2308 NG tube appears to terminate in esophagus, will advance, obtain repeat XR       Medications   ondansetron (ZOFRAN) injection 4 mg (4 mg Intravenous Given 25)   fentaNYL injection 50 mcg (50 mcg Intravenous Given 25)   sodium chloride 0.9 % bolus 1,000 mL (0 mL Intravenous Stopped 25)   pantoprazole (PROTONIX) injection 40 mg (40 mg Intravenous Given 25)   fentaNYL injection 50 mcg (50 mcg Intravenous Given 25)   iohexol (OMNIPAQUE) 350 MG/ML injection (MULTI-DOSE) 100 mL (100 mL Intravenous Given 25)   fentaNYL injection 50 mcg (50 mcg Intravenous Given 25)   lidocaine (URO-JET) 2 % urethral/mucosal gel 1 Application (1 Application Urethral Given 25)   lactated ringers bolus 1,000 mL (1,000 mL Intravenous New Bag 25)       ED Risk Strat Scores                          SBIRT 22yo+      Flowsheet Row Most Recent Value   Initial Alcohol Screen: US AUDIT-C     1. How often do you have a drink containing alcohol? 0 Filed at: 2025   2. How many drinks containing alcohol do you have on a typical day you are drinking?  0 Filed at: 2025   3b. FEMALE Any Age, or MALE 65+: How often do you have 4 or more drinks on one occassion? 0 Filed at: 2025   Audit-C Score 0 Filed at: 2025   SARA: How many times in the past year have you...    Used an illegal drug or used a prescription medication for non-medical reasons? Never Filed at: 2025                            History of Present Illness       Chief Complaint   Patient presents with    Chest Pain     Pt c/o mid chest pain and lower back pain that started at 530 tonight, +nausea        Past Medical History:   Diagnosis Date    Anxiety       Past Surgical History:   Procedure Laterality Date    BREAST SURGERY       SECTION      GASTRIC BYPASS      LIPOSUCTION      PA LAPS  ABD PRTM&OMENTUM DX W/WO SPEC BR/WA SPX N/A 1/25/2025    Procedure: LAPAROSCOPY DIAGNOSTIC, LYSIS OF ADHESIONS;  Surgeon: Nickolas Boogie MD;  Location: [facility];  Service: Bariatrics      Family History   Problem Relation Age of Onset    Thyroid disease Mother     Cancer Father         lungs    Heart disease Maternal Grandfather     Colon cancer Paternal Grandfather     Diabetes Paternal Grandfather     Heart disease Paternal Grandfather     Breast cancer Neg Hx     Prostate cancer Neg Hx       Social History     Tobacco Use    Smoking status: Never    Smokeless tobacco: Never   Vaping Use    Vaping status: Never Used   Substance Use Topics    Alcohol use: Never    Drug use: No      E-Cigarette/Vaping    E-Cigarette Use Never User       E-Cigarette/Vaping Substances      I have reviewed and agree with the history as documented.     Patient is a 39-year-old female with past medical history significant for anxiety presenting to emergency department in acute distress.  She is rocking back and forth on the bed, notes acute onset of significant mid back, epigastric pain starting at 5:30 PM.  She reports she has never felt pain like this in her life.  Denies pertinent past medical history or current medications.  Notes she started her period today.  Endorses significant nausea, denies any numbness, weakness, tingling of extremities.  Denies any recent diarrhea or constipation.  Reports this morning she was otherwise feeling okay.  Denies saddle anesthesia, bowel or bladder incontinence.  Denies recent falls, history of immunocompromise, IV drug use, history of cancer. Denies recent THC/CBD use.        Review of Systems   Constitutional:  Negative for chills and fever.   HENT:  Negative for ear pain and sore throat.    Eyes:  Negative for pain and visual disturbance.   Respiratory:  Negative for cough and shortness of breath.    Cardiovascular:  Negative for chest pain and palpitations.   Gastrointestinal:  Positive for  abdominal pain. Negative for vomiting.   Genitourinary:  Negative for dysuria and hematuria.   Musculoskeletal:  Positive for back pain. Negative for arthralgias.   Skin:  Negative for color change and rash.   Neurological:  Negative for seizures and syncope.   All other systems reviewed and are negative.          Objective       ED Triage Vitals   Temperature Pulse Blood Pressure Respirations SpO2 Patient Position - Orthostatic VS   01/24/25 1910 01/24/25 1910 01/24/25 1910 01/24/25 1910 01/24/25 1910 01/24/25 1910   97.8 °F (36.6 °C) 71 126/74 20 99 % Sitting      Temp Source Heart Rate Source BP Location FiO2 (%) Pain Score    01/24/25 1910 01/24/25 1910 01/24/25 1910 -- 01/25/25 0145    Temporal Monitor Left arm  5      Vitals      Date and Time Temp Pulse SpO2 Resp BP Pain Score FACES Pain Rating User   01/25/25 1133 -- -- -- -- 109/69 -- -- DII   01/25/25 1133 -- 92 97 % -- 109/69 -- -- DII   01/25/25 0750 -- -- -- -- -- No Pain -- YC   01/25/25 0730 98.6 °F (37 °C) 99 98 % -- 102/63 -- -- RC   01/25/25 0700 -- -- -- -- -- No Pain -- YC   01/25/25 0637 -- 85 98 % -- 93/55 -- -- DII   01/25/25 0530 -- 75 97 % -- 94/54 -- -- GP   01/25/25 0437 -- 69 99 % -- 102/64 -- -- DII   01/25/25 0406 98.4 °F (36.9 °C) -- -- 17 -- -- -- DR   01/25/25 0406 -- 73 95 % -- 97/54 -- -- DII   01/25/25 0335 98.1 °F (36.7 °C) 82 99 % 17 99/61 -- -- DII   01/25/25 0334 97.3 °F (36.3 °C) 75 99 % -- 99/61 -- -- DII   01/25/25 0317 -- 88 99 % -- 99/58 -- -- DII   01/25/25 0302 98.5 °F (36.9 °C) 81 99 % 17 102/63 -- -- DII   01/25/25 0235 -- -- -- -- -- No Pain -- DR   01/25/25 0232 -- 69 99 % -- 102/62 -- -- DII   01/25/25 0212 -- -- -- -- -- 6 -- KM   01/25/25 0200 -- 98 100 % 26 103/58 7 -- KM   01/25/25 0145 -- 95 99 % 14 109/63 5 -- KM   01/25/25 0130 97.7 °F (36.5 °C) 103 100 % -- 108/58 -- -- SM   01/24/25 2345 -- 117 99 % 20 -- -- -- KB   01/24/25 2330 -- 116 98 % 20 119/68 -- -- KB   01/24/25 2315 -- 98 100 % 20 109/67 -- -- KB    01/24/25 2200 -- 99 97 % 20 -- -- -- KB   01/24/25 2145 -- 76 99 % 20 -- -- -- KB   01/24/25 2130 -- 63 99 % 20 112/66 -- -- KB   01/24/25 2115 -- 63 98 % 20 -- -- -- KB   01/24/25 2100 -- 62 100 % 20 109/66 -- -- KB   01/24/25 2030 -- 64 99 % 20 104/58 -- -- KB   01/24/25 2015 -- 70 100 % 20 105/62 -- -- KB   01/24/25 1910 97.8 °F (36.6 °C) 71 99 % 20 126/74 -- -- BS            Physical Exam  Vitals and nursing note reviewed.   Constitutional:       General: She is in acute distress.      Appearance: Normal appearance. She is not ill-appearing, toxic-appearing or diaphoretic.      Comments: Patient in significant distress, rocking on bed holding abdomen/back   HENT:      Head: Normocephalic and atraumatic.   Eyes:      General: No scleral icterus.        Right eye: No discharge.         Left eye: No discharge.      Extraocular Movements: Extraocular movements intact.      Conjunctiva/sclera: Conjunctivae normal.   Cardiovascular:      Rate and Rhythm: Normal rate.      Pulses: Normal pulses.      Heart sounds: Normal heart sounds. No murmur heard.     No friction rub. No gallop.   Pulmonary:      Effort: Pulmonary effort is normal. No respiratory distress.      Breath sounds: Normal breath sounds. No stridor. No wheezing, rhonchi or rales.   Abdominal:      General: Abdomen is flat. Bowel sounds are normal. There is no distension.      Palpations: Abdomen is soft.      Tenderness: There is no abdominal tenderness. There is no guarding or rebound.      Comments: Significant epigastric, RUQ discomfort to palpation. No rigidity, guarding, rebound   Musculoskeletal:         General: No swelling. Normal range of motion.      Cervical back: Normal range of motion. No rigidity.      Right lower leg: No edema.      Left lower leg: No edema.   Skin:     General: Skin is warm and dry.      Capillary Refill: Capillary refill takes less than 2 seconds.      Coloration: Skin is not jaundiced.      Findings: No bruising or  lesion.   Neurological:      General: No focal deficit present.      Mental Status: She is alert and oriented to person, place, and time. Mental status is at baseline.   Psychiatric:         Mood and Affect: Mood normal.         Behavior: Behavior normal.         Thought Content: Thought content normal.         Judgment: Judgment normal.         Results Reviewed       Procedure Component Value Units Date/Time    hCG, quantitative [[id number]]  (Normal) Collected: 01/24/25 2008    Lab Status: Final result Specimen: Blood from Arm, Left Updated: 01/24/25 2143     HCG, Quant <0.6 mIU/mL     Narrative:       Expected Ranges:    HCG results between 5.0 and 25.0 mIU/mL may be indicative of early pregnancy but should be interpreted in light of the total clinical presentation.    HCG can rise to detectable levels in shawn and post menopausal women (0-11.6 mIU/mL).     Approximate               Approximate HCG  Gestation age          Concentration ( mIU/mL)  _____________          ______________________   Weeks                      HCG values  0.2-1                       5-50  1-2                           2-3                         100-5000  3-4                         500-20017  4-5                         1000-95724  5-6                         [id number]  6-8                         [id number]  8-12                        [id number]      Comprehensive metabolic panel [[id number]] Collected: 01/24/25 2008    Lab Status: Final result Specimen: Blood from Arm, Left Updated: 01/24/25 2041     Sodium 135 mmol/L      Potassium 3.5 mmol/L      Chloride 103 mmol/L      CO2 21 mmol/L      ANION GAP 11 mmol/L      BUN 13 mg/dL      Creatinine 0.82 mg/dL      Glucose 123 mg/dL      Calcium 9.0 mg/dL      AST 25 U/L      ALT 22 U/L      Alkaline Phosphatase 53 U/L      Total Protein 6.7 g/dL      Albumin 4.3 g/dL      Total Bilirubin 0.35 mg/dL      eGFR 91 ml/min/1.73sq m     Narrative:      National Kidney Disease  Foundation guidelines for Chronic Kidney Disease (CKD):     Stage 1 with normal or high GFR (GFR > 90 mL/min/1.73 square meters)    Stage 2 Mild CKD (GFR = 60-89 mL/min/1.73 square meters)    Stage 3A Moderate CKD (GFR = 45-59 mL/min/1.73 square meters)    Stage 3B Moderate CKD (GFR = 30-44 mL/min/1.73 square meters)    Stage 4 Severe CKD (GFR = 15-29 mL/min/1.73 square meters)    Stage 5 End Stage CKD (GFR <15 mL/min/1.73 square meters)  Note: GFR calculation is accurate only with a steady state creatinine    Lipase [[id number]]  (Normal) Collected: 01/24/25 2008    Lab Status: Final result Specimen: Blood from Arm, Left Updated: 01/24/25 2041     Lipase 46 u/L     CBC and differential [[id number]]  (Abnormal) Collected: 01/24/25 2008    Lab Status: Final result Specimen: Blood from Arm, Left Updated: 01/24/25 2017     WBC 13.88 Thousand/uL      RBC 4.08 Million/uL      Hemoglobin 10.5 g/dL      Hematocrit 32.5 %      MCV 80 fL      MCH 25.7 pg      MCHC 32.3 g/dL      RDW 16.0 %      MPV 10.3 fL      Platelets 316 Thousands/uL      nRBC 0 /100 WBCs      Segmented % 58 %      Immature Grans % 0 %      Lymphocytes % 31 %      Monocytes % 9 %      Eosinophils Relative 1 %      Basophils Relative 1 %      Absolute Neutrophils 8.03 Thousands/µL      Absolute Immature Grans 0.04 Thousand/uL      Absolute Lymphocytes 4.32 Thousands/µL      Absolute Monocytes 1.22 Thousand/µL      Eosinophils Absolute 0.14 Thousand/µL      Basophils Absolute 0.13 Thousands/µL             XR chest 1 view portable   Final Interpretation by Lili Ibrahim MD (01/25 0659)      No acute cardiopulmonary disease.      NG tube in stomach.      Workstation performed: [id number]         XR chest portable   Final Interpretation by Lili Ibrahim MD (01/25 0659)      No acute cardiopulmonary disease.      NG tube in mid esophagus, subsequently advanced into the stomach.      Workstation performed: [id number]         CTA dissection protocol  chest/abdomen/pelvis   Final Interpretation by E. Alec Schoenberger, MD (01/24 2157)   No aneurysm or dissection. No atherosclerotic disease.      Severe focal narrowing of an SMA branch in the left upper quadrant and focal interruption of an adjacent branch. There is swirling of the vessels and edema in the surrounding left upper quadrant mesentery. Findings are new compared with prior CT and    suspicious for internal hernia. Recommend surgical evaluation         I personally discussed this study with SHERRON CEJA SONALI on 1/24/2025 9:43 PM.            Workstation performed: [id number]             Procedures    ED Medication and Procedure Management   Prior to Admission Medications   Prescriptions Last Dose Informant Patient Reported? Taking?   hydrOXYzine HCL (ATARAX) 25 mg tablet   No No   Sig: Take 1 tablet (25 mg total) by mouth every 6 (six) hours as needed for itching   norelgestromin-ethinyl estradiol (ORTHO EVRA) 150-35 MCG/24HR   Yes No   Sig: Place 1 patch on the skin once a week   Patient not taking: Reported on 7/10/2024      Facility-Administered Medications: None     Discharge Medication List as of 1/25/2025 11:05 AM        CONTINUE these medications which have NOT CHANGED    Details   hydrOXYzine HCL (ATARAX) 25 mg tablet Take 1 tablet (25 mg total) by mouth every 6 (six) hours as needed for itching, Starting Wed 7/10/2024, Normal      norelgestromin-ethinyl estradiol (ORTHO EVRA) 150-35 MCG/24HR Place 1 patch on the skin once a week, Starting Fri 4/5/2024, Until Sat 4/5/2025, Historical Med           Outpatient Discharge Orders   Discharge Diet     Activity as tolerated     Lifting restrictions     No strenuous exercise     No driving until     Call provider for:  persistent nausea or vomiting     Call provider for:  severe uncontrolled pain     Call provider for:  redness, tenderness, or signs of infection (pain, swelling, redness, odor or green/yellow discharge around incision site)     ED  SEPSIS DOCUMENTATION   Time reflects when diagnosis was documented in both MDM as applicable and the Disposition within this note       Time User Action Codes Description Comment    1/24/2025 10:15 PM Zoe Holm Add [K45.8] Internal hernia     1/24/2025 10:15 PM Zoe Holm [R10.9] Abdominal pain                  Zoe Holm,   01/27/25 1548

## 2025-01-25 NOTE — PLAN OF CARE
Problem: PAIN - ADULT  Goal: Verbalizes/displays adequate comfort level or baseline comfort level  Description: Interventions:  - Encourage patient to monitor pain and request assistance  - Assess pain using appropriate pain scale  - Administer analgesics based on type and severity of pain and evaluate response  - Implement non-pharmacological measures as appropriate and evaluate response  - Consider cultural and social influences on pain and pain management  - Notify physician/advanced practitioner if interventions unsuccessful or patient reports new pain  Outcome: Progressing     Problem: INFECTION - ADULT  Goal: Absence or prevention of progression during hospitalization  Description: INTERVENTIONS:  - Assess and monitor for signs and symptoms of infection  - Monitor lab/diagnostic results  - Monitor all insertion sites, i.e. indwelling lines, tubes, and drains  - Monitor endotracheal if appropriate and nasal secretions for changes in amount and color  - [place] appropriate cooling/warming therapies per order  - Administer medications as ordered  - Instruct and encourage patient and family to use good hand hygiene technique  - Identify and instruct in appropriate isolation precautions for identified infection/condition  Outcome: Progressing  Goal: Absence of fever/infection during neutropenic period  Description: INTERVENTIONS:  - Monitor WBC    Outcome: Progressing     Problem: SAFETY ADULT  Goal: Patient will remain free of falls  Description: INTERVENTIONS:  - Educate patient/family on patient safety including physical limitations  - Instruct patient to call for assistance with activity   - Consult OT/PT to assist with strengthening/mobility   - Keep Call bell within reach  - Keep bed low and locked with side rails adjusted as appropriate  - Keep care items and personal belongings within reach  - Initiate and maintain comfort rounds  - Make Fall Risk Sign visible to staff  - Apply yellow socks and bracelet  for high fall risk patients  - Consider moving patient to room near nurses station  Outcome: Progressing  Goal: Maintain or return to baseline ADL function  Description: INTERVENTIONS:  -  Assess patient's ability to carry out ADLs; assess patient's baseline for ADL function and identify physical deficits which impact ability to perform ADLs (bathing, care of mouth/teeth, toileting, grooming, dressing, etc.)  - Assess/evaluate cause of self-care deficits   - Assess range of motion  - Assess patient's mobility; develop plan if impaired  - Assess patient's need for assistive devices and provide as appropriate  - Encourage maximum independence but intervene and supervise when necessary  - Involve family in performance of ADLs  - Assess for home care needs following discharge   - Consider OT consult to assist with ADL evaluation and planning for discharge  - Provide patient education as appropriate  Outcome: Progressing  Goal: Maintains/Returns to pre admission functional level  Description: INTERVENTIONS:  - Perform AM-PAC 6 Click Basic Mobility/ Daily Activity assessment daily.  - Set and communicate daily mobility goal to care team and patient/family/caregiver.   - Collaborate with rehabilitation services on mobility goals if consulted  - Out of bed for toileting  - Record patient progress and toleration of activity level   Outcome: Progressing     Problem: DISCHARGE PLANNING  Goal: Discharge to home or other facility with appropriate resources  Description: INTERVENTIONS:  - Identify barriers to discharge w/patient and caregiver  - Arrange for needed discharge resources and transportation as appropriate  - Identify discharge learning needs (meds, wound care, etc.)  - Arrange for interpretive services to assist at discharge as needed  - Refer to Case Management Department for coordinating discharge planning if the patient needs post-hospital services based on physician/advanced practitioner order or complex needs  related to functional status, cognitive ability, or social support system  Outcome: Progressing     Problem: Knowledge Deficit  Goal: Patient/family/caregiver demonstrates understanding of disease process, treatment plan, medications, and discharge instructions  Description: Complete learning assessment and assess knowledge base.  Interventions:  - Provide teaching at level of understanding  - Provide teaching via preferred learning methods  Outcome: Progressing

## 2025-01-25 NOTE — DISCHARGE INSTRUCTIONS
your medications (if applicable) from [facility] in Hospital Lobby   It is not necessary to cut or open medications unless it is better for you to tolerate that way, you can mix with liquid to drink  Take Tylenol every 8 hours around the clock, unless instructed otherwise  It is important to stay hydrated and follow your discharge diet progression   Mild nausea is ok as long as you can drink fluids, sip very slowly and get up and walk during any periods of nausea  You may shower normally after 48 hours, but do not scrub incision sites, blot gently with clean towel to dry incisions  Take home medications as usual unless instructed otherwise while in hospital  Follow up with Dr. Devaughn Boogie and your PCP within the next week  ONLY IF instructed: Complete full course of lovenox injections

## 2025-01-25 NOTE — H&P
Consultation - Bariatric Surgery   Shirley Barton 39 y.o. female MRN: [id number]  Unit/Bed#: [facility] Encounter: [id number]    Assessment & Plan     Assessment:  39 year old female who underwent bypass surgery approx 15 years ago in [place]. [place]. CT with evidence of internal hernia. WBC elevated.     Plan:  OR for diagnostic lap, reduction of internal hernia  Consent signed PARQ held    History of Present Illness     HPI:  Shirley Barton is a 39 y.o. pains started around 5:30 PM. With progressive abdominal pain and nausea and brining up flem. Not on blood thinners.     Prior Tummy Tuck    Consults    Review of Systems   Constitutional:  Negative for chills and fever.   HENT:  Negative for sore throat.    Eyes:  Negative for visual disturbance.   Respiratory:  Negative for cough and shortness of breath.    Gastrointestinal:  Negative for abdominal pain and vomiting.   Musculoskeletal:  Negative for arthralgias.   Skin:  Negative for color change and rash.   Neurological:  Negative for seizures and syncope.   All other systems reviewed and are negative.      Historical Information   Past Medical History:   Diagnosis Date    Anxiety      Past Surgical History:   Procedure Laterality Date    BREAST SURGERY       SECTION      GASTRIC BYPASS      LIPOSUCTION       Social History   Social History     Substance and Sexual Activity   Alcohol Use No     Social History     Substance and Sexual Activity   Drug Use No     Social History     Tobacco Use   Smoking Status Never   Smokeless Tobacco Never     Family History: Family history non-contributory    Meds/Allergies   all current active meds have been reviewed  Allergies   Allergen Reactions    Other Allergic Rhinitis     Trees/Pollen/Ragweed       Objective   First Vitals:   Blood Pressure: 126/74 (25)  Pulse: 71 (25)  Temperature: 97.8 °F (36.6 °C) (25)  Temp Source: Temporal (25)  Respirations: 20 (25)  Weight -  Scale: 67.7 kg (149 lb 4 oz) (01/24/25 1910)  SpO2: 99 % (01/24/25 1910)    Current Vitals:   Blood Pressure: 119/68 (01/24/25 2330)  Pulse: (!) 117 (01/24/25 2345)  Temperature: 97.8 °F (36.6 °C) (01/24/25 1910)  Temp Source: Temporal (01/24/25 1910)  Respirations: 20 (01/24/25 2345)  Weight - Scale: 67.7 kg (149 lb 4 oz) (01/24/25 1910)  SpO2: 99 % (01/24/25 2345)    No intake or output data in the 24 hours ending 01/25/25 0009    Invasive Devices       Peripheral Intravenous Line  Duration             Peripheral IV 01/24/25 Left Antecubital <1 day              Drain  Duration             NG/OG/Enteral Tube 16 Fr Right nare <1 day                    Physical Exam  Vitals reviewed.   Constitutional:       Appearance: Normal appearance.   HENT:      Head: Atraumatic.      Nose: Nose normal.   Cardiovascular:      Rate and Rhythm: Normal rate.      Pulses: Normal pulses.   Pulmonary:      Effort: Pulmonary effort is normal.      Breath sounds: Normal breath sounds.   Abdominal:      Comments: TTP   Musculoskeletal:         General: Normal range of motion.   Neurological:      General: No focal deficit present.   Psychiatric:         Behavior: Behavior normal.           Lab Results: I have personally reviewed pertinent lab results.    Imaging: Results Review Statement: I personally reviewed the following image studies/reports in PACS and discussed pertinent findings with Radiology:  . My interpretation of the radiology images/reports is:  .  EKG, Pathology, and Other Studies: Results Review Statement: I personally reviewed the following image studies/reports in PACS and discussed pertinent findings with Radiology:  . My interpretation of the radiology images/reports is:  .    Counseling / Coordination of Care  Total floor / unit time spent today 15 minutes.  Greater than 50% of total time was spent with the patient and / or family counseling and / or coordination of care.

## 2025-01-25 NOTE — ANESTHESIA POSTPROCEDURE EVALUATION
Post-Op Assessment Note    CV Status:  Stable  Pain Score: 0    Pain management: adequate    Multimodal analgesia used between 6 hours prior to anesthesia start to PACU discharge    Mental Status:  Sleepy and arousable   Hydration Status:  Euvolemic   PONV Controlled:  Controlled   Airway Patency:  Patent  Airway: intubated  Two or more mitigation strategies used for obstructive sleep apnea   Post Op Vitals Reviewed: Yes    No anethesia notable event occurred.    Staff: Anesthesiologist, with CRNAs           Last Filed PACU Vitals:  Vitals Value Taken Time   Temp 97.7 0132   Pulse 103 0132   /58 0132   Resp 12 0132   SpO2 98% 0132

## 2025-01-25 NOTE — DISCHARGE INSTR - AVS FIRST PAGE
Bariatric/Weight Loss Surgery  Hospital Discharge Instructions  ACTIVITY:  Progress as feels comfortable - a good rule is:  if you are doing something and it begins to hurt, stop doing the activity. Walk every hour while at home.  You may walk stairs if you do so slowly  You may shower 48 hours after surgery.  Do not scrub incision sites.  Blot gently with clean towel to dry incisions. (see #4 below)   Use your incentive spirometer 10 times per hour while awake for 1 week after surgery.  Do NOT drive for 48 hours after surgery. No driving 24 hours after taking certain prescription pain medications. Examples of such medication are Percocet, Darvocet, Oxycodone, Tylenol #3, and Tylenol with Codeine.     DIET  You may advance your diet as tolerated    MEDICATIONS:  The abdominal nerve block will wear off during the first 1-2 days that you are home, and you may become sore (especially over incision site/sites where abdominal wall is sutured). This may create a pulling sensation, especially while moving around, and will fade over time.  Continue to take your Tylenol and your pain medication as instructed.     INCISION CARE  You may shower and get incisions wet 2 days after surgery. No soaking tub baths or swimming for 30 days after surgery. Keep abdominal area and incisions clean. Use soap and water to create a good lather and rinse off.  Do not scrub incisions.   If you have a drain, empty the drain as the nurses instructed.    FOLLOW-UP APPOINTMENT should be made for one week after discharge. Call surgeon’s office at [phone] to schedule an appointment.    CALL YOUR DOCTOR FOR:  pain not controlled by pain medications, a temperature greater than 101.5° F, any increase or change in drainage or redness from any incision, any vomiting or inability to keep liquids down, shortness of breath, shoulder pain, or bleeding

## 2025-01-25 NOTE — UTILIZATION REVIEW
Initial Clinical Review    Admission: Date/Time/Statement:   Admission Orders (From admission, onward)       Ordered        01/25/25 0128  Inpatient Admission  Once                          Orders Placed This Encounter   Procedures    Inpatient Admission     Standing Status:   Standing     Number of Occurrences:   1     Level of Care:   Med Surg [16]     Estimated length of stay:   Inpatient Only Surgery     ED Arrival Information       Expected   -    Arrival   1/24/2025 19:05    Acuity   Urgent              Means of arrival   Walk-In    Escorted by   Self    Service   Surgery-General    Admission type   Emergency              Arrival complaint   CHEST PAIN : MID BACK             Chief Complaint   Patient presents with    Chest Pain     Pt c/o mid chest pain and lower back pain that started at 530 tonight, +nausea        Initial Presentation: 39 y.o. female to ED presented on 1/24 for progressive abdominal pain and nausea and brining up flem around 5:30 pm. CT with evidence of internal hernia. WBC elevated.   History of Bypass surgery approx. 15 yrs ago in [place].   Admit to Inpatient Dx; Internal Hernia  Plan; Plan OR for diagnostic lap, reduction of internal hernia.  On exam; Abdomen TTP     1/25 OR - S/p LAPAROSCOPY DIAGNOSTIC. LYSIS OF ADHESIONS   Operative Findings:  Ascites fluid along the left colonic gutter  Delgadillo's and JJ defect was obliterated from initial surgery  Single adhesive band was taken down in the right upper quadrant.  There was a segment of dilated loop of bowel distal to the JJ anastomosis with congested mesentery, likely spontaneously reduced.    1/25   Postop; This am pt had mild nausea and abdominal pain. Tolerating clear liquid diet.   Iv PPI and iv fluids.   Ambulating and voiding independently.  Plan for discharge today       ED Treatment-Medication Administration from 01/24/2025 1905 to 01/25/2025 0024         Date/Time Order Dose Route Action     01/24/2025 2005 ondansetron  (ZOFRAN) injection 4 mg 4 mg Intravenous Given     01/24/2025 2005 fentaNYL injection 50 mcg 50 mcg Intravenous Given     01/24/2025 2006 sodium chloride 0.9 % bolus 1,000 mL 1,000 mL Intravenous New Bag     01/24/2025 2005 pantoprazole (PROTONIX) injection 40 mg 40 mg Intravenous Given     01/24/2025 2042 fentaNYL injection 50 mcg 50 mcg Intravenous Given     01/24/2025 2053 iohexol (OMNIPAQUE) 350 MG/ML injection (MULTI-DOSE) 100 mL 100 mL Intravenous Given     01/24/2025 2236 fentaNYL injection 50 mcg 50 mcg Intravenous Given     01/24/2025 2234 lidocaine (URO-JET) 2 % urethral/mucosal gel 1 Application 1 Application Urethral Given     01/24/2025 2350 lactated ringers bolus 1,000 mL 1,000 mL Intravenous New Bag            Scheduled Medications:  famotidine, 20 mg, Intravenous, BID      Continuous IV Infusions:  lactated ringers, 100 mL/hr, Intravenous, Continuous      PRN Meds:  acetaminophen, 650 mg, Oral, Q6H PRN  diphenhydrAMINE, 25 mg, Oral, Q8H PRN  ondansetron, 4 mg, Intravenous, Q6H PRN  oxyCODONE, 10 mg, Oral, Q4H PRN  oxyCODONE, 5 mg, Oral, Q4H PRN  phenol, 2 spray, Mouth/Throat, Q2H PRN  simethicone, 80 mg, Oral, 4x Daily PRN      ED Triage Vitals   Temperature Pulse Respirations Blood Pressure SpO2 Pain Score   01/24/25 1910 01/24/25 1910 01/24/25 1910 01/24/25 1910 01/24/25 1910 01/25/25 0145   97.8 °F (36.6 °C) 71 20 126/74 99 % 5     Weight (last 2 days)       Date/Time Weight    01/25/25 02:32:22 67.9 (149.69)    01/24/25 1910 67.7 (149.25)            Vital Signs (last 3 days)       Date/Time Temp Pulse Resp BP MAP (mmHg) SpO2 O2 Device Cardiac (WDL) Patient Position - Orthostatic VS [place] Coma Scale Score Pain    01/25/25 11:33:32 -- -- -- 109/69 82 -- -- -- -- -- --    01/25/25 11:33:18 -- 92 -- 109/69 82 97 % -- -- -- -- --    01/25/25 0750 -- -- -- -- -- -- -- -- -- 15 No Pain    01/25/25 0730 98.6 °F (37 °C) 99 -- 102/63 76 98 % None (Room air) -- Lying -- --    01/25/25 0700 -- -- -- -- --  -- -- -- -- -- No Pain    01/25/25 06:37:12 -- 85 -- 93/55 68 98 % -- -- -- -- --    01/25/25 0530 -- 75 -- 94/54 70 97 % -- -- -- -- --    01/25/25 04:37:04 -- 69 -- 102/64 77 99 % -- -- -- -- --    01/25/25 04:06:33 98.4 °F (36.9 °C) 73 17 97/54 68 95 % -- -- -- -- --    01/25/25 03:35:05 98.1 °F (36.7 °C) 82 17 99/61 74 99 % -- -- -- -- --    01/25/25 03:34:20 97.3 °F (36.3 °C) 75 -- 99/61 74 99 % -- -- -- -- --    01/25/25 03:17:39 -- 88 -- 99/58 72 99 % -- -- -- -- --    01/25/25 03:02:04 98.5 °F (36.9 °C) 81 17 102/63 76 99 % -- -- -- -- --    01/25/25 0235 -- -- -- -- -- -- None (Room air) -- -- 15 No Pain    01/25/25 02:32:22 -- 69 -- 102/62 75 99 % -- -- -- -- --    01/25/25 0215 -- -- -- -- -- -- None (Room air) [facility] -- 15 --    01/25/25 0212 -- -- -- -- -- -- -- -- -- -- 6    01/25/25 0200 -- 98 26 103/58 75 100 % None (Room air) -- -- -- 7    01/25/25 0145 -- 95 14 109/63 -- 99 % None (Room air) [facility] -- 15 5    01/25/25 0130 97.7 °F (36.5 °C) 103 -- 108/58 72 100 % None (Room air) -- -- -- --    01/24/25 2345 -- 117 20 -- -- 99 % None (Room air) -- -- -- --    01/24/25 2330 -- 116 20 119/68 85 98 % None (Room air) -- -- -- --    01/24/25 2315 -- 98 20 109/67 83 100 % -- -- -- -- --    01/24/25 2200 -- 99 20 -- -- 97 % None (Room air) -- -- -- --    01/24/25 2145 -- 76 20 -- -- 99 % None (Room air) -- -- -- --    01/24/25 2130 -- 63 20 112/66 84 99 % None (Room air) -- -- -- --    01/24/25 2115 -- 63 20 -- -- 98 % None (Room air) -- -- -- --    01/24/25 2100 -- 62 20 109/66 82 100 % None (Room air) -- -- -- --    01/24/25 2030 -- 64 20 104/58 76 99 % None (Room air) -- -- -- --    01/24/25 2015 -- 70 20 105/62 78 100 % None (Room air) -- -- -- --    01/24/25 1910 97.8 °F (36.6 °C) 71 20 126/74 96 99 % None (Room air) -- Sitting -- --              Pertinent Labs/Diagnostic Test Results:   Radiology:  XR chest 1 view portable   Final Interpretation by Lili Ibrahim MD (01/25 0659)      No acute  cardiopulmonary disease.      NG tube in stomach.      Workstation performed: [id number]         XR chest portable   Final Interpretation by Lili Ibrahim MD (01/25 0659)      No acute cardiopulmonary disease.      NG tube in mid esophagus, subsequently advanced into the stomach.      Workstation performed: [id number]         CTA dissection protocol chest/abdomen/pelvis   Final Interpretation by E. Alec Schoenberger, MD (01/24 2157)   No aneurysm or dissection. No atherosclerotic disease.      Severe focal narrowing of an SMA branch in the left upper quadrant and focal interruption of an adjacent branch. There is swirling of the vessels and edema in the surrounding left upper quadrant mesentery. Findings are new compared with prior CT and    suspicious for internal hernia. Recommend surgical evaluation         I personally discussed this study with SHERRON LYON on 1/24/2025 9:43 PM.            Workstation performed: [id number]           Cardiology:  ECG 12 lead    by Interface, Ris Results In (01/24 1913)        GI:  No orders to display           Results from last 7 days   Lab Units 01/25/25  0438 01/24/25 2008   WBC Thousand/uL 13.27* 13.88*   HEMOGLOBIN g/dL 10.0* 10.5*   HEMATOCRIT % 31.0* 32.5*   PLATELETS Thousands/uL 244 316   TOTAL NEUT ABS Thousands/µL  --  8.03*         Results from last 7 days   Lab Units 01/25/25  0438 01/24/25 2008   SODIUM mmol/L 134* 135   POTASSIUM mmol/L 3.9 3.5   CHLORIDE mmol/L 103 103   CO2 mmol/L 23 21   ANION GAP mmol/L 8 11   BUN mg/dL 11 13   CREATININE mg/dL 0.74 0.82   EGFR ml/min/1.73sq m 103 91   CALCIUM mg/dL 8.5 9.0     Results from last 7 days   Lab Units 01/24/25 2008   AST U/L 25   ALT U/L 22   ALK PHOS U/L 53   TOTAL PROTEIN g/dL 6.7   ALBUMIN g/dL 4.3   TOTAL BILIRUBIN mg/dL 0.35         Results from last 7 days   Lab Units 01/25/25  0438 01/24/25 2008   GLUCOSE RANDOM mg/dL 127 123       Results from last 7 days   Lab Units 01/24/25 2008    LIPASE u/L 46         Past Medical History:   Diagnosis Date    Anxiety      Present on Admission:  **None**      Admitting Diagnosis: Chest pain [R07.9]  Abdominal pain [R10.9]  Internal hernia [K45.8]  Age/Sex: 39 y.o. female    Network Utilization Review Department  ATTENTION: Please call with any questions or concerns to [phone] and carefully listen to the prompts so that you are directed to the right person. All voicemails are confidential.   For Discharge needs, contact Care Management DC Support Team at [phone] opt. [phone]  Send all requests for admission clinical reviews, approved or denied determinations and any other requests to dedicated fax number below belonging to the campus where the patient is receiving treatment. List of dedicated fax numbers for the Facilities:  FACILITY NAME UR FAX NUMBER   ADMISSION DENIALS (Administrative/Medical Necessity) [phone]   DISCHARGE SUPPORT TEAM (NETWORK) [phone]   PARENT CHILD HEALTH (Maternity/NICU/Pediatrics) [phone]   [facility] [phone]   [facility] [phone]   [facility] [phone]   [facility] [phone]   [facility] [phone]   [facility] [phone]   [facility] [phone]   [facility] [phone]   [facility] [phone]   [facility] [phone]   [facility] [phone]   [facility] [phone]

## 2025-01-25 NOTE — ED NOTES
Notified provider OG tube confirmation X ray ordered. Only small amount of output noted from NG tube.      Mee Clements RN  01/24/25 6187

## 2025-01-25 NOTE — PLAN OF CARE
Problem: PAIN - ADULT  Goal: Verbalizes/displays adequate comfort level or baseline comfort level  Description: Interventions:  - Encourage patient to monitor pain and request assistance  - Assess pain using appropriate pain scale  - Administer analgesics based on type and severity of pain and evaluate response  - Implement non-pharmacological measures as appropriate and evaluate response  - Consider cultural and social influences on pain and pain management  - Notify physician/advanced practitioner if interventions unsuccessful or patient reports new pain  1/25/2025 1338 by Colleen Henson RN  Outcome: Adequate for Discharge  1/25/2025 4770 by Colleen Henson RN  Outcome: Progressing

## 2025-01-27 ENCOUNTER — PATIENT OUTREACH (OUTPATIENT)
Dept: CASE MANAGEMENT | Facility: OTHER | Age: 40
End: 2025-01-27

## 2025-01-27 ENCOUNTER — TELEPHONE (OUTPATIENT)
Age: 40
End: 2025-01-27

## 2025-01-27 ENCOUNTER — TRANSITIONAL CARE MANAGEMENT (OUTPATIENT)
Dept: INTERNAL MEDICINE CLINIC | Facility: CLINIC | Age: 40
End: 2025-01-27

## 2025-01-27 DIAGNOSIS — Z91.89 HIGH RISK FOR READMISSION: Primary | ICD-10-CM

## 2025-01-27 NOTE — TELEPHONE ENCOUNTER
Patient called for a postop appointment with Dr. Devaughn Boogie. Made a warm transfer of the call to [facility] in the [place] office.

## 2025-01-27 NOTE — PROGRESS NOTES
In basket message received with a referral from the HRR report. Chart reviewed.  Shirley was admitted to [facility] with abdominal pain. She was diagnosed with an internal hernia.She had a diagnostic laparoscopic lysis of adhesions on 1/25.  She discharged home to self care.  I spoke with Shirley who reports she has no questions at this time. She needs assistance with her TCM appointment. She called the office but missed the return call.   I advised her I will message the office and ask them to call her.   She thanked me for calling but declined care management.  I updated the care coordination note.

## 2025-01-27 NOTE — TELEPHONE ENCOUNTER
Pt called back to schedule her TCM appointment.      Warm transfer to office clerical for further assistance but no answer and patient did not want to continue holding.    Pt requesting a call back to schedule the appointment after 2:10 pm today.

## 2025-01-27 NOTE — TELEPHONE ENCOUNTER
Patient called to schedule her TCM apt. Attempted to warm transfer, but unsuccessful.     Please advise  Thank you

## 2025-01-28 ENCOUNTER — OFFICE VISIT (OUTPATIENT)
Dept: INTERNAL MEDICINE CLINIC | Facility: CLINIC | Age: 40
End: 2025-01-28
Payer: COMMERCIAL

## 2025-01-28 VITALS
DIASTOLIC BLOOD PRESSURE: 60 MMHG | HEART RATE: 82 BPM | HEIGHT: 61 IN | OXYGEN SATURATION: 98 % | SYSTOLIC BLOOD PRESSURE: 104 MMHG | BODY MASS INDEX: 28.28 KG/M2

## 2025-01-28 DIAGNOSIS — D64.9 ANEMIA, UNSPECIFIED TYPE: ICD-10-CM

## 2025-01-28 DIAGNOSIS — R53.81 MALAISE AND FATIGUE: ICD-10-CM

## 2025-01-28 DIAGNOSIS — R53.83 MALAISE AND FATIGUE: ICD-10-CM

## 2025-01-28 DIAGNOSIS — Z98.890 HISTORY OF HERNIA REPAIR: Primary | ICD-10-CM

## 2025-01-28 DIAGNOSIS — Z87.19 HISTORY OF HERNIA REPAIR: Primary | ICD-10-CM

## 2025-01-28 PROBLEM — F33.1 MODERATE EPISODE OF RECURRENT MAJOR DEPRESSIVE DISORDER (HCC): Status: RESOLVED | Noted: 2022-03-10 | Resolved: 2025-01-28

## 2025-01-28 PROBLEM — J30.9 ALLERGIC RHINITIS: Status: ACTIVE | Noted: 2025-01-28

## 2025-01-28 PROBLEM — O03.4 RETAINED PRODUCTS OF CONCEPTION FOLLOWING ABORTION: Status: RESOLVED | Noted: 2024-05-03 | Resolved: 2025-01-28

## 2025-01-28 PROBLEM — Q51.9 UTERINE ANOMALY: Status: RESOLVED | Noted: 2024-02-14 | Resolved: 2025-01-28

## 2025-01-28 PROBLEM — J30.89 ENVIRONMENTAL AND SEASONAL ALLERGIES: Status: ACTIVE | Noted: 2020-02-28

## 2025-01-28 PROCEDURE — 99495 TRANSJ CARE MGMT MOD F2F 14D: CPT

## 2025-01-28 PROCEDURE — 99214 OFFICE O/P EST MOD 30 MIN: CPT

## 2025-01-28 NOTE — PROGRESS NOTES
Transition of Care Visit  Name: Shirley Barton      : 1985      MRN: [id number]  Encounter Provider: MERI Michel  Encounter Date: 2025   Encounter department: [facility] INTERNAL MEDICINE [place]    Assessment & Plan  History of hernia repair  Admitted from - for diagnostic laparoscopy and reduction of internal hernia after complaints of abdominal pain with nausea. Follow up with bariatrics on .  Abdomen is soft, nondistended with hyperactive bowel sounds, 4 troch sites with glue.        Anemia, unspecified type  CBC  showed hemoglobin of 10 and elevated white blood cell at 13.27. Will recheck labs.  Orders:  •  CBC and differential; Future    Malaise and fatigue  Patient states she was feeling better postdischarge, has begun to feel increasingly more fatigued, queasy, swollen, and generally feeling more poor.  Will check CBC.  Encouraged patient to stay active, eating and drinking well.            History of Present Illness   {?Quick Links Encounters * My Last Note * Last Note in Specialty * Snapshot * Since Last Visit * History :55852}  Transitional Care Management Review:   Shirley Barton is a 39 y.o. female here for TCM follow up.     During the TCM phone call patient stated:  TCM Call     Date and time call was made  2025  8:49 AM    Hospital care reviewed  Records reviewed    Patient was hospitialized at  [facility]    Date of Admission  25    Date of discharge  25    Diagnosis  Abdominal pain    Disposition  Home    Were the patients medications reviewed and updated  Yes    Current Symptoms  Neausea; Fatigue      TCM Call     Should patient be enrolled in anticoag monitoring?  No    Scheduled for follow up?  Yes    Patients specialists  Other (comment)    Other specialists names  Reina Alaniz PA-C [facility]    Other specialists contcat #  Reina Alaniz PA-C [facility]    I have advised the patient to  call PCP with any new or worsening symptoms  Carissa Leslei LPN    Living Arrangements  Family members    Are you recieving any outpatient services  No    Are you recieving home care services  No    Are you using any community resources  No    Current waiver services  No    Have you fallen in the last 12 months  No    Interperter language line needed  No    Counseling  Patient        Admitted from 1/24-1/25 for diagnostic laparoscopy and reduction of internal hernia after complaints of abdominal pain with nausea. She has a history of gastric bypass. Surgery went smoothly and was discharged the next day. She's expereicning a lot of swelling, she has a lot of bowel sounds, burping, weird taste in her mouth. Back pain and chills last night, no fever. First stool had a little but of blood in it, none since.       Review of Systems   Constitutional:  Positive for chills and fatigue. Negative for fever.   HENT:  Negative for congestion, ear pain, rhinorrhea and sore throat.    Eyes:  Negative for pain and visual disturbance.   Respiratory:  Negative for cough, chest tightness and shortness of breath.    Cardiovascular:  Negative for chest pain, palpitations and leg swelling.   Gastrointestinal:  Positive for blood in stool (x1 occurance) and nausea. Negative for abdominal distention, abdominal pain, constipation, diarrhea, rectal pain and vomiting.        Gas   Endocrine: Negative.    Genitourinary:  Negative for dysuria, frequency, hematuria and urgency.   Musculoskeletal:  Negative for arthralgias and back pain.   Skin:  Negative for color change and rash.   Allergic/Immunologic: Negative.    Neurological:  Negative for dizziness, seizures, syncope and headaches.   Hematological: Negative.    Psychiatric/Behavioral: Negative.     All other systems reviewed and are negative.    Objective {?Quick Links Trend Vitals * Enter New Vitals * Results Review * Timeline (Adult) * Labs * Imaging * Cardiology * Procedures * Lung  "Cancer Screening * Surgical eConsent :29004}  /60 (BP Location: Right arm, Patient Position: Sitting, Cuff Size: Standard)   Pulse 82   Ht 5' 1\" (1.549 m)   SpO2 98%   BMI 28.28 kg/m²     Physical Exam  Vitals and nursing note reviewed.   Constitutional:       General: She is not in acute distress.     Appearance: She is well-developed.   Cardiovascular:      Rate and Rhythm: Normal rate and regular rhythm.      Pulses: Normal pulses.      Heart sounds: Normal heart sounds. No murmur heard.  Pulmonary:      Effort: Pulmonary effort is normal. No respiratory distress.      Breath sounds: Normal breath sounds.   Abdominal:      General: Abdomen is flat. Bowel sounds are increased. There is no distension.      Palpations: Abdomen is soft.      Tenderness: There is abdominal tenderness. There is no guarding.      Comments: 4 healing troch sites   Musculoskeletal:         General: No swelling. Normal range of motion.      Cervical back: Normal range of motion and neck supple.   Skin:     General: Skin is warm and dry.      Capillary Refill: Capillary refill takes less than 2 seconds.   Neurological:      Mental Status: She is alert and oriented to person, place, and time.   Psychiatric:         Mood and Affect: Mood normal.       Medications have been reviewed by provider in current encounter      "

## 2025-01-28 NOTE — UTILIZATION REVIEW
NOTIFICATION OF ADMISSION DISCHARGE   This is a Notification of Discharge from [facility]. Please be advised that this patient has been discharge from our facility. Below you will find the admission and discharge date and time including the patient’s disposition.   UTILIZATION REVIEW CONTACT:  Andreia Leonardo  Utilization   Network Utilization Review Department  Phone: [phone] x carefully listen to the prompts. All voicemails are confidential.  Email: [email or web address]     ADMISSION INFORMATION  PRESENTATION DATE: 1/24/2025  7:12 PM  OBERVATION ADMISSION DATE: N/A  INPATIENT ADMISSION DATE: 1/25/25  1:28 AM   DISCHARGE DATE: 1/25/2025  1:39 PM   DISPOSITION:Home/Self Care    Network Utilization Review Department  ATTENTION: Please call with any questions or concerns to [phone] and carefully listen to the prompts so that you are directed to the right person. All voicemails are confidential.   For Discharge needs, contact Care Management DC Support Team at [phone] opt. [phone]  Send all requests for admission clinical reviews, approved or denied determinations and any other requests to dedicated fax number below belonging to the campus where the patient is receiving treatment. List of dedicated fax numbers for the Facilities:  FACILITY NAME UR FAX NUMBER   ADMISSION DENIALS (Administrative/Medical Necessity) [phone]   DISCHARGE SUPPORT TEAM ([facility]) [phone]   PARENT CHILD HEALTH (Maternity/NICU/Pediatrics) [phone]   [facility] [phone]   [facility] [phone]   [facility] [phone]   [facility] [phone]   [facility] [phone]   [facility] [phone]   [facility] [phone]   [facility] [phone]    [facility] [phone]   [facility] [phone]   [facility] [phone]   [facility] [phone]

## 2025-01-29 ENCOUNTER — APPOINTMENT (EMERGENCY)
Dept: RADIOLOGY | Facility: HOSPITAL | Age: 40
End: 2025-01-29
Payer: COMMERCIAL

## 2025-01-29 ENCOUNTER — APPOINTMENT (EMERGENCY)
Dept: CT IMAGING | Facility: HOSPITAL | Age: 40
End: 2025-01-29
Payer: COMMERCIAL

## 2025-01-29 ENCOUNTER — APPOINTMENT (OUTPATIENT)
Age: 40
End: 2025-01-29
Payer: COMMERCIAL

## 2025-01-29 ENCOUNTER — HOSPITAL ENCOUNTER (EMERGENCY)
Facility: HOSPITAL | Age: 40
Discharge: HOME | End: 2025-01-30
Attending: EMERGENCY MEDICINE
Payer: COMMERCIAL

## 2025-01-29 ENCOUNTER — RESULTS FOLLOW-UP (OUTPATIENT)
Dept: INTERNAL MEDICINE CLINIC | Facility: CLINIC | Age: 40
End: 2025-01-29

## 2025-01-29 DIAGNOSIS — D64.9 ANEMIA, UNSPECIFIED TYPE: ICD-10-CM

## 2025-01-29 DIAGNOSIS — R10.9 ABDOMINAL PAIN: Primary | ICD-10-CM

## 2025-01-29 DIAGNOSIS — D50.8 OTHER IRON DEFICIENCY ANEMIA: Primary | ICD-10-CM

## 2025-01-29 DIAGNOSIS — M79.89 SWELLING OF LOWER EXTREMITY: ICD-10-CM

## 2025-01-29 LAB
ALBUMIN SERPL BCG-MCNC: 4.5 G/DL (ref 3.5–5)
ALP SERPL-CCNC: 72 U/L (ref 34–104)
ALT SERPL W P-5'-P-CCNC: 69 U/L (ref 7–52)
ANION GAP SERPL CALCULATED.3IONS-SCNC: 10 MMOL/L (ref 4–13)
APTT PPP: 29 SECONDS (ref 23–34)
AST SERPL W P-5'-P-CCNC: 45 U/L (ref 13–39)
BACTERIA UR QL AUTO: NORMAL /HPF
BASOPHILS # BLD AUTO: 0.06 THOUSANDS/ΜL (ref 0–0.1)
BASOPHILS # BLD AUTO: 0.08 THOUSANDS/ΜL (ref 0–0.1)
BASOPHILS NFR BLD AUTO: 1 % (ref 0–1)
BASOPHILS NFR BLD AUTO: 1 % (ref 0–1)
BILIRUB SERPL-MCNC: 0.4 MG/DL (ref 0.2–1)
BILIRUB UR QL STRIP: NEGATIVE
BUN SERPL-MCNC: 9 MG/DL (ref 5–25)
CALCIUM SERPL-MCNC: 8.8 MG/DL (ref 8.4–10.2)
CARDIAC TROPONIN I PNL SERPL HS: <2 NG/L (ref ?–50)
CHLORIDE SERPL-SCNC: 106 MMOL/L (ref 96–108)
CLARITY UR: CLEAR
CO2 SERPL-SCNC: 23 MMOL/L (ref 21–32)
COLOR UR: COLORLESS
CREAT SERPL-MCNC: 0.77 MG/DL (ref 0.6–1.3)
EOSINOPHIL # BLD AUTO: 0.14 THOUSAND/ΜL (ref 0–0.61)
EOSINOPHIL # BLD AUTO: 0.18 THOUSAND/ΜL (ref 0–0.61)
EOSINOPHIL NFR BLD AUTO: 2 % (ref 0–6)
EOSINOPHIL NFR BLD AUTO: 3 % (ref 0–6)
ERYTHROCYTE [DISTWIDTH] IN BLOOD BY AUTOMATED COUNT: 15.8 % (ref 11.6–15.1)
ERYTHROCYTE [DISTWIDTH] IN BLOOD BY AUTOMATED COUNT: 16 % (ref 11.6–15.1)
FLUAV AG UPPER RESP QL IA.RAPID: NEGATIVE
FLUBV AG UPPER RESP QL IA.RAPID: NEGATIVE
GFR SERPL CREATININE-BSD FRML MDRD: 98 ML/MIN/1.73SQ M
GLUCOSE SERPL-MCNC: 88 MG/DL (ref 65–140)
GLUCOSE UR STRIP-MCNC: NEGATIVE MG/DL
HCG SERPL QL: NEGATIVE
HCT VFR BLD AUTO: 29.4 % (ref 34.8–46.1)
HCT VFR BLD AUTO: 32.6 % (ref 34.8–46.1)
HGB BLD-MCNC: 9.6 G/DL (ref 11.5–15.4)
HGB BLD-MCNC: 9.9 G/DL (ref 11.5–15.4)
HGB UR QL STRIP.AUTO: NEGATIVE
IMM GRANULOCYTES # BLD AUTO: 0.01 THOUSAND/UL (ref 0–0.2)
IMM GRANULOCYTES # BLD AUTO: 0.02 THOUSAND/UL (ref 0–0.2)
IMM GRANULOCYTES NFR BLD AUTO: 0 % (ref 0–2)
IMM GRANULOCYTES NFR BLD AUTO: 0 % (ref 0–2)
INR PPP: 0.9 (ref 0.85–1.19)
KETONES UR STRIP-MCNC: NEGATIVE MG/DL
LACTATE SERPL-SCNC: 1.4 MMOL/L (ref 0.5–2)
LEUKOCYTE ESTERASE UR QL STRIP: ABNORMAL
LIPASE SERPL-CCNC: 32 U/L (ref 11–82)
LYMPHOCYTES # BLD AUTO: 1.92 THOUSANDS/ΜL (ref 0.6–4.47)
LYMPHOCYTES # BLD AUTO: 3.16 THOUSANDS/ΜL (ref 0.6–4.47)
LYMPHOCYTES NFR BLD AUTO: 33 % (ref 14–44)
LYMPHOCYTES NFR BLD AUTO: 45 % (ref 14–44)
MCH RBC QN AUTO: 25.4 PG (ref 26.8–34.3)
MCH RBC QN AUTO: 25.9 PG (ref 26.8–34.3)
MCHC RBC AUTO-ENTMCNC: 30.4 G/DL (ref 31.4–37.4)
MCHC RBC AUTO-ENTMCNC: 32.7 G/DL (ref 31.4–37.4)
MCV RBC AUTO: 80 FL (ref 82–98)
MCV RBC AUTO: 84 FL (ref 82–98)
MONOCYTES # BLD AUTO: 0.43 THOUSAND/ΜL (ref 0.17–1.22)
MONOCYTES # BLD AUTO: 0.64 THOUSAND/ΜL (ref 0.17–1.22)
MONOCYTES NFR BLD AUTO: 7 % (ref 4–12)
MONOCYTES NFR BLD AUTO: 9 % (ref 4–12)
NEUTROPHILS # BLD AUTO: 3.05 THOUSANDS/ΜL (ref 1.85–7.62)
NEUTROPHILS # BLD AUTO: 3.19 THOUSANDS/ΜL (ref 1.85–7.62)
NEUTS SEG NFR BLD AUTO: 43 % (ref 43–75)
NEUTS SEG NFR BLD AUTO: 56 % (ref 43–75)
NITRITE UR QL STRIP: NEGATIVE
NON-SQ EPI CELLS URNS QL MICRO: NORMAL /HPF
NRBC BLD AUTO-RTO: 0 /100 WBCS
NRBC BLD AUTO-RTO: 0 /100 WBCS
PH UR STRIP.AUTO: 6 [PH]
PLATELET # BLD AUTO: 257 THOUSANDS/UL (ref 149–390)
PLATELET # BLD AUTO: 264 THOUSANDS/UL (ref 149–390)
PMV BLD AUTO: 10.1 FL (ref 8.9–12.7)
PMV BLD AUTO: 10.8 FL (ref 8.9–12.7)
POTASSIUM SERPL-SCNC: 3.9 MMOL/L (ref 3.5–5.3)
PROT SERPL-MCNC: 7.3 G/DL (ref 6.4–8.4)
PROT UR STRIP-MCNC: NEGATIVE MG/DL
PROTHROMBIN TIME: 12.9 SECONDS (ref 12.3–15)
RBC # BLD AUTO: 3.7 MILLION/UL (ref 3.81–5.12)
RBC # BLD AUTO: 3.9 MILLION/UL (ref 3.81–5.12)
RBC #/AREA URNS AUTO: NORMAL /HPF
SARS-COV+SARS-COV-2 AG RESP QL IA.RAPID: NEGATIVE
SODIUM SERPL-SCNC: 139 MMOL/L (ref 135–147)
SP GR UR STRIP.AUTO: 1.01 (ref 1–1.03)
UROBILINOGEN UR STRIP-ACNC: <2 MG/DL
WBC # BLD AUTO: 5.79 THOUSAND/UL (ref 4.31–10.16)
WBC # BLD AUTO: 7.09 THOUSAND/UL (ref 4.31–10.16)
WBC #/AREA URNS AUTO: NORMAL /HPF

## 2025-01-29 PROCEDURE — 85025 COMPLETE CBC W/AUTO DIFF WBC: CPT

## 2025-01-29 PROCEDURE — 80053 COMPREHEN METABOLIC PANEL: CPT

## 2025-01-29 PROCEDURE — 74177 CT ABD & PELVIS W/CONTRAST: CPT

## 2025-01-29 PROCEDURE — 84484 ASSAY OF TROPONIN QUANT: CPT

## 2025-01-29 PROCEDURE — 83880 ASSAY OF NATRIURETIC PEPTIDE: CPT

## 2025-01-29 PROCEDURE — 83690 ASSAY OF LIPASE: CPT

## 2025-01-29 PROCEDURE — 84703 CHORIONIC GONADOTROPIN ASSAY: CPT

## 2025-01-29 PROCEDURE — 81001 URINALYSIS AUTO W/SCOPE: CPT

## 2025-01-29 PROCEDURE — 99284 EMERGENCY DEPT VISIT MOD MDM: CPT

## 2025-01-29 PROCEDURE — 83605 ASSAY OF LACTIC ACID: CPT

## 2025-01-29 PROCEDURE — 87811 SARS-COV-2 COVID19 W/OPTIC: CPT

## 2025-01-29 PROCEDURE — 85610 PROTHROMBIN TIME: CPT

## 2025-01-29 PROCEDURE — 36415 COLL VENOUS BLD VENIPUNCTURE: CPT

## 2025-01-29 PROCEDURE — 87804 INFLUENZA ASSAY W/OPTIC: CPT

## 2025-01-29 PROCEDURE — 96361 HYDRATE IV INFUSION ADD-ON: CPT

## 2025-01-29 PROCEDURE — 85730 THROMBOPLASTIN TIME PARTIAL: CPT

## 2025-01-29 PROCEDURE — 93005 ELECTROCARDIOGRAM TRACING: CPT

## 2025-01-29 PROCEDURE — 71046 X-RAY EXAM CHEST 2 VIEWS: CPT

## 2025-01-29 PROCEDURE — 96375 TX/PRO/DX INJ NEW DRUG ADDON: CPT

## 2025-01-29 PROCEDURE — 96374 THER/PROPH/DIAG INJ IV PUSH: CPT

## 2025-01-29 RX ORDER — FERROUS SULFATE 324(65)MG
324 TABLET, DELAYED RELEASE (ENTERIC COATED) ORAL
Qty: 90 TABLET | Refills: 3 | Status: SHIPPED | OUTPATIENT
Start: 2025-01-29 | End: 2025-03-11 | Stop reason: SINTOL

## 2025-01-29 RX ORDER — ONDANSETRON 2 MG/ML
4 INJECTION INTRAMUSCULAR; INTRAVENOUS ONCE
Status: COMPLETED | OUTPATIENT
Start: 2025-01-29 | End: 2025-01-29

## 2025-01-29 RX ADMIN — SODIUM CHLORIDE 500 ML: 0.9 INJECTION, SOLUTION INTRAVENOUS at 21:28

## 2025-01-29 RX ADMIN — IOHEXOL 25 ML: 240 INJECTION, SOLUTION INTRATHECAL; INTRAVASCULAR; INTRAVENOUS; ORAL at 22:53

## 2025-01-29 RX ADMIN — ONDANSETRON 4 MG: 2 INJECTION INTRAMUSCULAR; INTRAVENOUS at 21:30

## 2025-01-29 RX ADMIN — MORPHINE SULFATE 2 MG: 2 INJECTION, SOLUTION INTRAMUSCULAR; INTRAVENOUS at 21:30

## 2025-01-29 RX ADMIN — IOHEXOL 100 ML: 350 INJECTION, SOLUTION INTRAVENOUS at 22:57

## 2025-01-29 NOTE — Clinical Note
Shirley Barton was seen and treated in our emergency department on 1/29/2025.                Diagnosis:     Shirley  may return to work on return date, is off the rest of the shift today.    She may return on this date: 02/07/2025         If you have any questions or concerns, please don't hesitate to call.      Arron Mckenzie PA-C    ______________________________           _______________          _______________  Hospital Representative                              Date                                Time

## 2025-01-30 VITALS
OXYGEN SATURATION: 96 % | DIASTOLIC BLOOD PRESSURE: 55 MMHG | HEART RATE: 81 BPM | TEMPERATURE: 97.8 F | SYSTOLIC BLOOD PRESSURE: 102 MMHG | RESPIRATION RATE: 16 BRPM

## 2025-01-30 LAB
ATRIAL RATE: 75 BPM
BNP SERPL-MCNC: 15 PG/ML (ref 0–100)
CARDIAC TROPONIN I PNL SERPL HS: <2 NG/L (ref ?–50)
P AXIS: 55 DEGREES
PR INTERVAL: 144 MS
QRS AXIS: 81 DEGREES
QRSD INTERVAL: 86 MS
QT INTERVAL: 370 MS
QTC INTERVAL: 413 MS
T WAVE AXIS: 35 DEGREES
VENTRICULAR RATE: 75 BPM

## 2025-01-30 PROCEDURE — 93010 ELECTROCARDIOGRAM REPORT: CPT | Performed by: INTERNAL MEDICINE

## 2025-01-30 PROCEDURE — 96376 TX/PRO/DX INJ SAME DRUG ADON: CPT

## 2025-01-30 RX ORDER — ONDANSETRON 2 MG/ML
4 INJECTION INTRAMUSCULAR; INTRAVENOUS ONCE
Status: COMPLETED | OUTPATIENT
Start: 2025-01-30 | End: 2025-01-30

## 2025-01-30 RX ORDER — POLYETHYLENE GLYCOL 3350 17 G/17G
17 POWDER, FOR SOLUTION ORAL DAILY
Qty: 510 G | Refills: 0 | Status: SHIPPED | OUTPATIENT
Start: 2025-01-30

## 2025-01-30 RX ORDER — OXYCODONE HYDROCHLORIDE 5 MG/1
5 TABLET ORAL EVERY 4 HOURS PRN
Qty: 8 TABLET | Refills: 0 | Status: SHIPPED | OUTPATIENT
Start: 2025-01-30

## 2025-01-30 RX ORDER — MORPHINE SULFATE 4 MG/ML
4 INJECTION, SOLUTION INTRAMUSCULAR; INTRAVENOUS ONCE
Status: COMPLETED | OUTPATIENT
Start: 2025-01-30 | End: 2025-01-30

## 2025-01-30 RX ADMIN — MORPHINE SULFATE 4 MG: 4 INJECTION INTRAVENOUS at 00:51

## 2025-01-30 RX ADMIN — ONDANSETRON 4 MG: 2 INJECTION INTRAMUSCULAR; INTRAVENOUS at 00:51

## 2025-01-30 NOTE — ED PROVIDER NOTES
Time reflects when diagnosis was documented in both MDM as applicable and the Disposition within this note       Time User Action Codes Description Comment    1/30/2025  1:57 AM Arron Mckenzie Add [R10.9] Abdominal pain     1/30/2025  1:58 AM Arrno Mckenzie Add [R60.0] Lower extremity edema     1/30/2025  1:58 AM Arron Mckenzie Remove [R60.0] Lower extremity edema     1/30/2025  1:58 AM Arron Mckenzie Add [M79.89] Swelling of lower extremity     1/30/2025  1:59 AM Arron Mckenzie Modify [M79.89] Bilateral Swelling of lower extremities           ED Disposition       ED Disposition   Discharge    Condition   Stable    Date/Time   Thu Jan 30, 2025  1:59 AM    Comment   Shirley Barton discharge to home/self care.                   Assessment & Plan       Medical Decision Making  Abdominal exam without peritoneal signs. No evidence of acute abdomen at this time. Well appearing. Given work up, low suspicion for acute hepatobiliary disease (including acute cholecystitis or cholangitis), acute pancreatitis (neg lipase), PUD (including gastric perforation), acute infectious processes (pneumonia, hepatitis, pyelonephritis), acute appendicitis, vascular catastrophe, bowel obstruction, viscus perforation, or genital torsion, diverticulitis.  Patient neurovascularly intact, pedal pulses 2+, nonpitting swelling, no erythema or warmth or overlying pain. CT abdomen and pelvis showed No acute findings in the abdomen or pelvis.  Mild colonic fecal retention suggesting constipation  Discussed with bariatrics on call, who had no further recommendations from their standpoint and she has follow up appt on Friday. Patient given pain medicine, bowel regimen and discussed elevation of legs and compression stockings. Discussed strict return parameters. Prior to discharge, discharge instructions were discussed with patient at bedside. Patient was provided both verbal and written instructions. Patient is understanding of the discharge instructions  and is agreeable to plan of care. Return precautions were discussed with patient bedside, patient verbalized understanding of signs and symptoms that would necessitate return to the ED. All questions were answered. Patient was comfortable with the plan of care and discharged to home.       Problems Addressed:  Abdominal pain: acute illness or injury  Bilateral Swelling of lower extremities: acute illness or injury    Amount and/or Complexity of Data Reviewed  Labs: ordered. Decision-making details documented in ED Course.  Radiology: ordered and independent interpretation performed. Decision-making details documented in ED Course.    Risk  Prescription drug management.        ED Course as of 01/30/25 0502   Wed Jan 29, 2025 2159 PREGNANCY, SERUM: Negative   Thu Jan 30, 2025   0018 hs TnI 2hr: <2   0023 CT abdomen pelvis with contrast  No acute findings in the abdomen or pelvis.     Mild colonic fecal retention suggesting constipation     0138 BNP: 15       Medications   ondansetron (ZOFRAN) injection 4 mg (4 mg Intravenous Given 1/29/25 2130)   morphine injection 2 mg (2 mg Intravenous Given 1/29/25 2130)   sodium chloride 0.9 % bolus 500 mL (0 mL Intravenous Stopped 1/29/25 2228)   iohexol (OMNIPAQUE) 350 MG/ML injection (MULTI-DOSE) 100 mL (100 mL Intravenous Given 1/29/25 2257)   iohexol (OMNIPAQUE) 240 MG/ML solution 25 mL (25 mL Oral Given 1/29/25 2253)   morphine injection 4 mg (4 mg Intravenous Given 1/30/25 0051)   ondansetron (ZOFRAN) injection 4 mg (4 mg Intravenous Given 1/30/25 0051)       ED Risk Strat Scores   HEART Risk Score      Flowsheet Row Most Recent Value   Heart Score Risk Calculator    History 0 Filed at: 01/30/2025 0354   ECG 1 Filed at: 01/30/2025 0354   Age 0 Filed at: 01/30/2025 0354   Risk Factors 1 Filed at: 01/30/2025 0354   Troponin 0 Filed at: 01/30/2025 0354   HEART Score 2 Filed at: 01/30/2025 0354          HEART Risk Score      Flowsheet Row Most Recent Value   Heart Score Risk  Calculator    History 0 Filed at: 20254   ECG 1 Filed at: 2025   Age 0 Filed at: 2025   Risk Factors 1 Filed at: 2025   Troponin 0 Filed at: 2025   HEART Score 2 Filed at: 2025                            SBIRT 22yo+      Flowsheet Row Most Recent Value   Initial Alcohol Screen: US AUDIT-C     1. How often do you have a drink containing alcohol? 0 Filed at: 2025   2. How many drinks containing alcohol do you have on a typical day you are drinking?  0 Filed at: 2025   3a. Male UNDER 65: How often do you have five or more drinks on one occasion? 0 Filed at: 2025   3b. FEMALE Any Age, or MALE 65+: How often do you have 4 or more drinks on one occassion? 0 Filed at: 2025   Audit-C Score 0 Filed at: 2025   SARA: How many times in the past year have you...    Used an illegal drug or used a prescription medication for non-medical reasons? Never Filed at: 2025                            History of Present Illness       Chief Complaint   Patient presents with    Post-op Problem     Recent surg for a hiatal hernia that had strangulated a mesenteric artery, since then swelling in legs, hard distended abd, still moving bowels and passing gas, pale, chills, nausea, RLQ pain into back, surg f/u on fri, also requestign to be tested for hep c       Past Medical History:   Diagnosis Date    Anemia 2016    Anxiety     Moderate episode of recurrent major depressive disorder (HCC) 03/10/2022    Retained products of conception following  2024    Uterine anomaly 2024      Past Surgical History:   Procedure Laterality Date    BREAST SURGERY       SECTION      GASTRIC BYPASS      LIPOSUCTION      VT LAPS ABD PRTM&OMENTUM DX W/WO SPEC BR/WA SPX N/A 2025    Procedure: LAPAROSCOPY DIAGNOSTIC, LYSIS OF ADHESIONS;  Surgeon: Nickolas Boogie MD;  Location: [facility];  Service:  Bariatrics      Family History   Problem Relation Age of Onset    Thyroid disease Mother     Cancer Father         lungs    Heart disease Maternal Grandfather     Colon cancer Paternal Grandfather     Diabetes Paternal Grandfather     Heart disease Paternal Grandfather     Breast cancer Neg Hx     Prostate cancer Neg Hx       Social History     Tobacco Use    Smoking status: Never    Smokeless tobacco: Never   Vaping Use    Vaping status: Never Used   Substance Use Topics    Alcohol use: Never    Drug use: No      E-Cigarette/Vaping    E-Cigarette Use Never User       E-Cigarette/Vaping Substances      I have reviewed and agree with the history as documented.     The patient is a 39 y.o. female with a history of anemia, anxiety, major episode of recurrent depressive disorder, uterine anomaly who presents to [facility] with a chief complaint of abdominal pain. Symptoms began after adhesion lysis surgery for internal hernia on 1/25/25 and have been constant since onset. Her pain is currently rated as a 7/10 in severity and described as sharp right sided abdominal pain without radiation. Associated symptoms include nausea and bilateral lower extremity edema. Symptoms are aggravated with none noted and alleviating factors include none noted. The patient denies fever, chills, night sweats, cough, sputum, hemoptysis, hematemesis, hematochezia, melena, dysuria, recent emergency, falls, trauma, leg redness or pain. No other reported symptoms at this time.  Patient denies allergies to any medications        History provided by:  Patient   used: No        Review of Systems   Constitutional:  Positive for chills. Negative for fever.   HENT:  Negative for ear pain and sore throat.    Eyes:  Negative for pain and visual disturbance.   Respiratory:  Negative for cough and shortness of breath.    Cardiovascular:  Positive for leg swelling. Negative for chest pain and palpitations.    Gastrointestinal:  Positive for abdominal pain and nausea. Negative for vomiting.   Genitourinary:  Negative for dysuria and hematuria.   Musculoskeletal:  Negative for arthralgias and back pain.   Skin:  Negative for color change and rash.   Neurological:  Negative for dizziness, seizures, syncope, facial asymmetry, light-headedness and headaches.   All other systems reviewed and are negative.          Objective       ED Triage Vitals   Temperature Pulse Blood Pressure Respirations SpO2 Patient Position - Orthostatic VS   01/29/25 2022 01/29/25 2022 01/29/25 2022 01/29/25 2022 01/29/25 2022 01/30/25 0115   97.8 °F (36.6 °C) 83 125/84 18 99 % Sitting      Temp src Heart Rate Source BP Location FiO2 (%) Pain Score    -- 01/30/25 0115 01/30/25 0115 -- 01/29/25 2130     Monitor Left arm  4      Vitals      Date and Time Temp Pulse SpO2 Resp BP Pain Score FACES Pain Rating User   01/30/25 0115 -- 81 96 % 16 102/55 -- --    01/30/25 0051 -- -- -- -- -- 6 --    01/29/25 2130 -- -- -- -- -- 4 --    01/29/25 2022 97.8 °F (36.6 °C) 83 99 % 18 125/84 -- -- AR            Physical Exam  Vitals reviewed.   Constitutional:       General: She is not in acute distress.     Appearance: Normal appearance. She is not ill-appearing or toxic-appearing.   HENT:      Head: Normocephalic.      Right Ear: Tympanic membrane, ear canal and external ear normal.      Nose: Nose normal.      Mouth/Throat:      Mouth: Mucous membranes are moist.      Pharynx: Oropharynx is clear. No oropharyngeal exudate.   Eyes:      General: No scleral icterus.     Conjunctiva/sclera: Conjunctivae normal.   Cardiovascular:      Rate and Rhythm: Normal rate.      Pulses: Normal pulses.   Pulmonary:      Effort: Pulmonary effort is normal. No respiratory distress.      Breath sounds: Normal breath sounds. No stridor. No wheezing, rhonchi or rales.   Abdominal:      General: Abdomen is flat. Bowel sounds are normal.      Palpations: Abdomen is soft.       Tenderness: There is abdominal tenderness in the right upper quadrant, right lower quadrant and periumbilical area. There is no guarding.   Musculoskeletal:         General: Swelling present. No tenderness, deformity or signs of injury. Normal range of motion.      Cervical back: Normal range of motion and neck supple.      Right lower leg: Swelling present.      Left lower leg: Swelling present.      Comments: Bilateral nonpitting swelling to both legs, pedal pulses 2+, no signs of erythema, warmth, palpable pain, negative Homans' sign, full range of motion and neurovascular intact   Skin:     General: Skin is warm and dry.      Capillary Refill: Capillary refill takes less than 2 seconds.      Coloration: Skin is not jaundiced or pale.      Findings: No bruising, erythema or lesion.   Neurological:      Mental Status: She is alert and oriented to person, place, and time. Mental status is at baseline.         Results Reviewed       Procedure Component Value Units Date/Time    B-Type Natriuretic Peptide(BNP) [[id number]]  (Normal) Collected: 01/29/25 2123    Lab Status: Final result Specimen: Blood from Arm, Right Updated: 01/30/25 0131     BNP 15 pg/mL     HS Troponin I 2hr [[id number]] Collected: 01/29/25 2341    Lab Status: Final result Specimen: Blood from Arm, Right Updated: 01/30/25 0013     hs TnI 2hr <2 ng/L      Delta 2hr hsTnI --    hCG, qualitative pregnancy [[id number]]  (Normal) Collected: 01/29/25 2124    Lab Status: Final result Specimen: Blood from Arm, Right Updated: 01/29/25 2156     Preg, Serum Negative    HS Troponin 0hr (reflex protocol) [[id number]]  (Normal) Collected: 01/29/25 2124    Lab Status: Final result Specimen: Blood from Arm, Right Updated: 01/29/25 2154     hs TnI 0hr <2 ng/L     Comprehensive metabolic panel [[id number]]  (Abnormal) Collected: 01/29/25 2124    Lab Status: Final result Specimen: Blood from Arm, Right Updated: 01/29/25 2147     Sodium 139 mmol/L      Potassium 3.9 mmol/L       Chloride 106 mmol/L      CO2 23 mmol/L      ANION GAP 10 mmol/L      BUN 9 mg/dL      Creatinine 0.77 mg/dL      Glucose 88 mg/dL      Calcium 8.8 mg/dL      AST 45 U/L      ALT 69 U/L      Alkaline Phosphatase 72 U/L      Total Protein 7.3 g/dL      Albumin 4.5 g/dL      Total Bilirubin 0.40 mg/dL      eGFR 98 ml/min/1.73sq m     Narrative:      National Kidney Disease Foundation guidelines for Chronic Kidney Disease (CKD):     Stage 1 with normal or high GFR (GFR > 90 mL/min/1.73 square meters)    Stage 2 Mild CKD (GFR = 60-89 mL/min/1.73 square meters)    Stage 3A Moderate CKD (GFR = 45-59 mL/min/1.73 square meters)    Stage 3B Moderate CKD (GFR = 30-44 mL/min/1.73 square meters)    Stage 4 Severe CKD (GFR = 15-29 mL/min/1.73 square meters)    Stage 5 End Stage CKD (GFR <15 mL/min/1.73 square meters)  Note: GFR calculation is accurate only with a steady state creatinine    Lipase [[id number]]  (Normal) Collected: 01/29/25 2124    Lab Status: Final result Specimen: Blood from Arm, Right Updated: 01/29/25 2147     Lipase 32 u/L     Lactic acid, plasma (w/reflex if result > 2.0) [[id number]]  (Normal) Collected: 01/29/25 2124    Lab Status: Final result Specimen: Blood from Arm, Right Updated: 01/29/25 2146     LACTIC ACID 1.4 mmol/L     Narrative:      Result may be elevated if tourniquet was used during collection.    FLU/COVID Rapid Antigen (30 min. TAT) - Preferred screening test in ED [[id number]]  (Normal) Collected: 01/29/25 2125    Lab Status: Final result Specimen: Nares from Nose Updated: 01/29/25 2146     SARS COV Rapid Antigen Negative     Influenza A Rapid Antigen Negative     Influenza B Rapid Antigen Negative    Narrative:      This test has been performed using the [employer] Gabriella 2 FLU+SARS Antigen test under the Emergency Use Authorization (EUA). This test has been validated by the  and verified by the performing laboratory. The Gabriella uses lateral flow immunofluorescent sandwich assay to  detect SARS-COV, Influenza A and Influenza B Antigen.     The [employer] Gabriella 2 SARS Antigen test does not differentiate between SARS-CoV and SARS-CoV-2.     Negative results are presumptive and may be confirmed with a molecular assay, if necessary, for patient management. Negative results do not rule out SARS-CoV-2 or influenza infection and should not be used as the sole basis for treatment or patient management decisions. A negative test result may occur if the level of antigen in a sample is below the limit of detection of this test.     Positive results are indicative of the presence of viral antigens, but do not rule out bacterial infection or co-infection with other viruses.     All test results should be used as an adjunct to clinical observations and other information available to the provider.    FOR PEDIATRIC PATIENTS - copy/paste COVID Guidelines URL to browser: [email or web address]    Protime-INR [[id number]]  (Normal) Collected: 01/29/25 2124    Lab Status: Final result Specimen: Blood from Arm, Right Updated: 01/29/25 2144     Protime 12.9 seconds      INR 0.90    Narrative:      INR Therapeutic Range    Indication                                             INR Range      Atrial Fibrillation                                               2.0-3.0  Hypercoagulable State                                    2.0.2.3  Left Ventricular Asist Device                            2.0-3.0  Mechanical Heart Valve                                  -    Aortic(with afib, MI, embolism, HF, LA enlargement,    and/or coagulopathy)                                     2.0-3.0 (2.5-3.5)     Mitral                                                             2.5-3.5  Prosthetic/Bioprosthetic Heart Valve               2.0-3.0  Venous thromboembolism (VTE: VT, PE        2.0-3.0    APTT [[id number]]  (Normal) Collected: 01/29/25 2124    Lab Status: Final result Specimen: Blood from  Arm, Right Updated: 01/29/25 2144     PTT 29 seconds     Urine Microscopic [[id number]]  (Normal) Collected: 01/29/25 2126    Lab Status: Final result Specimen: Urine, Clean Catch Updated: 01/29/25 2143     RBC, UA 1-2 /hpf      WBC, UA 1-2 /hpf      Epithelial Cells Occasional /hpf      Bacteria, UA None Seen /hpf     UA w Reflex to Microscopic w Reflex to Culture [[id number]]  (Abnormal) Collected: 01/29/25 2126    Lab Status: Final result Specimen: Urine, Clean Catch Updated: 01/29/25 2142     Color, UA Colorless     Clarity, UA Clear     Specific Gravity, UA 1.008     pH, UA 6.0     Leukocytes, UA Trace     Nitrite, UA Negative     Protein, UA Negative mg/dl      Glucose, UA Negative mg/dl      Ketones, UA Negative mg/dl      Urobilinogen, UA <2.0 mg/dl      Bilirubin, UA Negative     Occult Blood, UA Negative    CBC and differential [[id number]]  (Abnormal) Collected: 01/29/25 2123    Lab Status: Final result Specimen: Blood from Arm, Right Updated: 01/29/25 2130     WBC 7.09 Thousand/uL      RBC 3.70 Million/uL      Hemoglobin 9.6 g/dL      Hematocrit 29.4 %      MCV 80 fL      MCH 25.9 pg      MCHC 32.7 g/dL      RDW 15.8 %      MPV 10.1 fL      Platelets 264 Thousands/uL      nRBC 0 /100 WBCs      Segmented % 43 %      Immature Grans % 0 %      Lymphocytes % 45 %      Monocytes % 9 %      Eosinophils Relative 2 %      Basophils Relative 1 %      Absolute Neutrophils 3.05 Thousands/µL      Absolute Immature Grans 0.02 Thousand/uL      Absolute Lymphocytes 3.16 Thousands/µL      Absolute Monocytes 0.64 Thousand/µL      Eosinophils Absolute 0.14 Thousand/µL      Basophils Absolute 0.08 Thousands/µL             CT abdomen pelvis with contrast   Final Interpretation by Kash Elder DO (01/30 0019)      No acute findings in the abdomen or pelvis.      Mild colonic fecal retention suggesting constipation         Workstation performed: [id number]         XR chest 2 views   ED Interpretation by Arron Mckenzie PA-C  (01/30 0352)   No acute cardiopulmonary disease          Procedures    ED Medication and Procedure Management   Prior to Admission Medications   Prescriptions Last Dose Informant Patient Reported? Taking?   ferrous sulfate 324 (65 Fe) mg   No No   Sig: Take 1 tablet (324 mg total) by mouth daily before breakfast   hydrOXYzine HCL (ATARAX) 25 mg tablet   No No   Sig: Take 1 tablet (25 mg total) by mouth every 6 (six) hours as needed for itching   norelgestromin-ethinyl estradiol (ORTHO EVRA) 150-35 MCG/24HR   Yes No   Sig: Place 1 patch on the skin once a week   Patient not taking: Reported on 1/28/2025      Facility-Administered Medications: None     Discharge Medication List as of 1/30/2025  2:01 AM        START taking these medications    Details   oxyCODONE (Roxicodone) 5 immediate release tablet Take 1 tablet (5 mg total) by mouth every 4 (four) hours as needed for moderate pain Max Daily Amount: 30 mg, Starting Thu 1/30/2025, Normal      polyethylene glycol (GLYCOLAX) 17 GM/SCOOP powder Take 17 g by mouth daily, Starting Thu 1/30/2025, Normal           CONTINUE these medications which have NOT CHANGED    Details   ferrous sulfate 324 (65 Fe) mg Take 1 tablet (324 mg total) by mouth daily before breakfast, Starting Wed 1/29/2025, Normal      hydrOXYzine HCL (ATARAX) 25 mg tablet Take 1 tablet (25 mg total) by mouth every 6 (six) hours as needed for itching, Starting Wed 7/10/2024, Normal      norelgestromin-ethinyl estradiol (ORTHO EVRA) 150-35 MCG/24HR Place 1 patch on the skin once a week, Starting Fri 4/5/2024, Until Sat 4/5/2025, Historical Med           No discharge procedures on file.  ED SEPSIS DOCUMENTATION   Time reflects when diagnosis was documented in both MDM as applicable and the Disposition within this note       Time User Action Codes Description Comment    1/30/2025  1:57 AM Arron Mckenzie Add [R10.9] Abdominal pain     1/30/2025  1:58 AM Arron Mckenzie Add [R60.0] Lower extremity edema      1/30/2025  1:58 AM Arron Mckenzie Remove [R60.0] Lower extremity edema     1/30/2025  1:58 AM Arron Mckenzie Add [M79.89] Swelling of lower extremity     1/30/2025  1:59 AM Arron Mckenzie Modify [M79.89] Bilateral Swelling of lower extremities                  Arron Mckenzie PA-C  01/30/25 0501       Arron Mckenzie PA-C  01/30/25 0502

## 2025-01-30 NOTE — DISCHARGE INSTRUCTIONS
Follow-up with PCP and your surgeon.  Take medicine as directed.  Tylenol and Motrin as needed.  Continue to monitor symptoms at home.  Rest and elevate legs.  Use compression stockings as needed.  If you develop any fever, rigors, erythema or pain in the legs return to the ER.

## 2025-01-31 ENCOUNTER — OFFICE VISIT (OUTPATIENT)
Dept: BARIATRICS | Facility: CLINIC | Age: 40
End: 2025-01-31

## 2025-01-31 ENCOUNTER — TELEPHONE (OUTPATIENT)
Age: 40
End: 2025-01-31

## 2025-01-31 VITALS
BODY MASS INDEX: 26.87 KG/M2 | SYSTOLIC BLOOD PRESSURE: 104 MMHG | OXYGEN SATURATION: 99 % | DIASTOLIC BLOOD PRESSURE: 60 MMHG | WEIGHT: 146 LBS | HEART RATE: 63 BPM | HEIGHT: 62 IN

## 2025-01-31 DIAGNOSIS — Z48.815 ENCOUNTER FOR SURGICAL AFTERCARE FOLLOWING SURGERY OF DIGESTIVE SYSTEM: Primary | ICD-10-CM

## 2025-01-31 DIAGNOSIS — K91.2 POSTSURGICAL MALABSORPTION: ICD-10-CM

## 2025-01-31 PROCEDURE — 99024 POSTOP FOLLOW-UP VISIT: CPT | Performed by: PHYSICIAN ASSISTANT

## 2025-01-31 NOTE — ASSESSMENT & PLAN NOTE
-At risk for malabsorption of vitamins/minerals secondary to malabsorption and restriction of intake from bariatric surgery  -NOT Currently taking adequate postop bariatric surgery vitamin supplementation - no vitamins, needs bariatric MVI with iron and calcium citrate 500mg TID    -Obtain CBC/Metabolic panel  -Patient received education about the importance of adhering to a lifelong supplementation regimen to avoid vitamin/mineral deficiencies

## 2025-01-31 NOTE — ASSESSMENT & PLAN NOTE
-s/p LRYGB with Dr. Vargas at [facility] about 15 years ago. Dx. Lap and MIGUELITO and reduction of internal hernia with Dr. Devaughn Boogie on 01/25/25.     Overall feeling much better with some mild incisional pain. She will alternate warm/cool compresses and wear abdominal binder and take Tylenol ATC x 3 days. Update me if no improvement.     Advised her to stop taking Tirzepatide compound every 30 days and risks of pancreatitis. She will meet with RD, LCSW, and MWM for additional support.    Discussed bowel regimen:  -Push hydrating fluids to goal of 64-80oz daily  -1 cap miralax daily (may separate into 1/2 cap BID)  -HS natural calm magnesium (1 Tbsp) in warm water  -1-2 soaked prunes in warm water, may need colace as well prn  -Include soluble and insoluble fiber daily  -Probiotics (Align or Culturelle)  -Squatty potty, avoid straining    F/u with me in 4-6 weeks

## 2025-01-31 NOTE — PROGRESS NOTES
Assessment/Plan:    Encounter for surgical aftercare following surgery of digestive system  -s/p LRYGB with Dr. Vargas at [facility] about 15 years ago. Dx. Lap and MIGUELITO and reduction of internal hernia with Dr. Devaughn Boogie on 01/25/25.     Overall feeling much better with some mild incisional pain. She will alternate warm/cool compresses and wear abdominal binder and take Tylenol ATC x 3 days. Update me if no improvement.     Advised her to stop taking Tirzepatide compound every 30 days and risks of pancreatitis. She will meet with RD, ANIYAHW, and MWM for additional support.    Discussed bowel regimen:  -Push hydrating fluids to goal of 64-80oz daily  -1 cap miralax daily (may separate into 1/2 cap BID)  -HS natural calm magnesium (1 Tbsp) in warm water  -1-2 soaked prunes in warm water, may need colace as well prn  -Include soluble and insoluble fiber daily  -Probiotics (Align or Culturelle)  -Squatty potty, avoid straining    F/u with me in 4-6 weeks    Postsurgical malabsorption  -At risk for malabsorption of vitamins/minerals secondary to malabsorption and restriction of intake from bariatric surgery  -NOT Currently taking adequate postop bariatric surgery vitamin supplementation - no vitamins, needs bariatric MVI with iron and calcium citrate 500mg TID    -Obtain CBC/Metabolic panel  -Patient received education about the importance of adhering to a lifelong supplementation regimen to avoid vitamin/mineral deficiencies        Diagnoses and all orders for this visit:    Encounter for surgical aftercare following surgery of digestive system    Postsurgical malabsorption  -     CBC and differential; Future  -     Folate; Future  -     Comprehensive metabolic panel; Future  -     Hemoglobin A1C; Future  -     Iron Panel (Includes Ferritin, Iron Sat%, Iron, and TIBC); Future  -     Vitamin A; Future  -     TSH, 3rd generation with Free T4 reflex; Future  -     PTH, intact; Future  -     Lipid panel; Future  -      Vitamin B1, whole blood; Future  -     Vitamin B12; Future  -     Vitamin D 25 hydroxy; Future  -     Zinc; Future  -     CBC and differential  -     Folate  -     Comprehensive metabolic panel  -     Hemoglobin A1C  -     Vitamin A  -     TSH, 3rd generation with Free T4 reflex  -     PTH, intact  -     Lipid panel  -     Vitamin B1, whole blood  -     Vitamin B12  -     Vitamin D 25 hydroxy  -     Zinc          Subjective:      Patient ID: Shirley Barton is a 39 y.o. female.  -s/p LRYGB with Dr. Vargas at [facility] about 15 years ago. Dx. Lap and MIGUELITO and reduction of internal hernia with Dr. Devaughn Boogie on 01/25/25. She was seen in the ED on 01/29/25 for persistent abdominal pain - CTAP was unremarkable but showed constipation. No passing flatus and having BM's but notes some constipation. She is taking compound Tirzepatide every 30 days - she believes 5mg dose. Very anxious about her food only eats very few foods.    She notes she started around 270lbs prior to her RYGB and got to a aretha of about 135lbs and is now mostly maintaining her weight around 145lbs but wishes to get back to 135lbs.    Has 10 yo Twins at [facility]. She is .       Diet Recall:   B - tuna on rice cake with cheese  L - tuna on rice cake with cheese and lettuce  D - fish or tofu and veggies or green salad or pizza     Fluids - 80oz + water, unsweet tea    The following portions of the patient's history were reviewed and updated as appropriate: allergies, current medications, past family history, past medical history, past social history, past surgical history and problem list.    Review of Systems   Constitutional:  Negative for chills and fever. Unexpected weight change: planned weight loss.  HENT:  Negative for trouble swallowing.    Respiratory:  Negative for cough and shortness of breath.    Cardiovascular:  Negative for chest pain and palpitations.   Gastrointestinal:  Positive for abdominal pain. Negative for constipation,  "diarrhea, nausea and vomiting.   Neurological:  Negative for dizziness.   Psychiatric/Behavioral:  The patient is nervous/anxious.          Objective:      /60 (BP Location: Left arm, Patient Position: Sitting, Cuff Size: Standard)   Pulse 63   Ht 5' 1.75\" (1.568 m)   Wt 66.2 kg (146 lb)   SpO2 99%   BMI 26.92 kg/m²     Colonoscopy-Not applicable       Physical Exam  Vitals reviewed.   Constitutional:       General: She is not in acute distress.     Appearance: She is well-developed.   HENT:      Head: Normocephalic and atraumatic.   Eyes:      General: No scleral icterus.  Cardiovascular:      Rate and Rhythm: Normal rate and regular rhythm.      Heart sounds: Normal heart sounds.   Pulmonary:      Effort: Pulmonary effort is normal. No respiratory distress.   Abdominal:      General: There is distension (mildly tender LLQ incision).      Palpations: Abdomen is soft.      Tenderness: There is no abdominal tenderness.      Comments: Incisions C/D/I,no signs of infection   Skin:     General: Skin is warm and dry.   Neurological:      Mental Status: She is alert.   Psychiatric:         Mood and Affect: Mood normal.         Behavior: Behavior normal.           BARRIERS: none identified    GOALS:   Continued/Maintain healthy weight loss with good nutrition intakes.  Adequate hydration with at least 64oz. fluid intake.  Normal vitamin and mineral levels.  Exercise as tolerated.  Natural Calm Vitality Magnesium powder and Miralax in warm water before bed  Stop Tirzepitide compound    Follow-up in 1 month. We kindly ask that your arrive 15 minutes before your scheduled appointment time with your provider to allow our staff to room you, get your vital signs and update your chart.    Follow diet as discussed.      Get lab work done in the next 2 weeks.  You have been given a lab slip today.  Please call the office if you need a replacement.  It is recommended to check with your insurance BEFORE getting labs done " to make sure they are covered by your policy.  Also, please check with your PCP and other providers before getting labs to avoid duplicate labs. Make sure to HOLD any multivitamins that may contain biotin and any biotin supplements FOR 5 DAYS before any labs since it can affect the results.    Follow vitamin and mineral recommendations as reviewed with you.    Call our office if you have any problems with abdominal pain especially associated with fever, chills, nausea, vomiting or any other concerns.    All  Post-bariatric surgery patients should be aware that very small quantities of any alcohol can cause impairment and it is very possible not to feel the effect. The effect can be in the system for several hours.  It is also a stomach irritant.     It is advised to AVOID alcohol, Nonsteroidal antiinflammatory drugs (NSAIDS) and nicotine of all forms . Any of these can cause stomach irritation/pain.

## 2025-01-31 NOTE — TELEPHONE ENCOUNTER
Patient called in to [facility] that lab orders were entered after her appt, she would like to go for these asap. Advised scripts were entered.

## 2025-01-31 NOTE — PATIENT INSTRUCTIONS
GOALS:   Continued/Maintain healthy weight loss with good nutrition intakes.  Adequate hydration with at least 64oz. fluid intake.  Normal vitamin and mineral levels.  Exercise as tolerated.  Natural Calm Vitality Magnesium powder and Miralax in warm water before bed  Stop Tirzepitide compound    Follow-up in 1 month. We kindly ask that your arrive 15 minutes before your scheduled appointment time with your provider to allow our staff to room you, get your vital signs and update your chart.    Follow diet as discussed.      Get lab work done in the next 2 weeks.  You have been given a lab slip today.  Please call the office if you need a replacement.  It is recommended to check with your insurance BEFORE getting labs done to make sure they are covered by your policy.  Also, please check with your PCP and other providers before getting labs to avoid duplicate labs. Make sure to HOLD any multivitamins that may contain biotin and any biotin supplements FOR 5 DAYS before any labs since it can affect the results.    Follow vitamin and mineral recommendations as reviewed with you.    Call our office if you have any problems with abdominal pain especially associated with fever, chills, nausea, vomiting or any other concerns.    All  Post-bariatric surgery patients should be aware that very small quantities of any alcohol can cause impairment and it is very possible not to feel the effect. The effect can be in the system for several hours.  It is also a stomach irritant.     It is advised to AVOID alcohol, Nonsteroidal antiinflammatory drugs (NSAIDS) and nicotine of all forms . Any of these can cause stomach irritation/pain.

## 2025-01-31 NOTE — PROGRESS NOTES
Assessment/Plan:    No problem-specific Assessment & Plan notes found for this encounter.       There are no diagnoses linked to this encounter.      Subjective:      Patient ID: Shirley Barton is a 39 y.o. female.    -s/p LRYGB with Dr. Vargas at [facility] about 15 year sago. Dx. Lap and MIGUELITO and reduction of internal hernia with Dr. Devaughn Boogie on 01/25/25.  She was seen in the ED on 01/29/25 for persistent abdominal pain - CTAP was unremarkable but showed constipation.    The following portions of the patient's history were reviewed and updated as appropriate: allergies, current medications, past family history, past medical history, past social history, past surgical history and problem list.    Review of Systems      Objective:      There were no vitals taken for this visit.    Colonoscopy-{yes/no/not applicable:80460}       Physical Exam      BARRIERS: none identified    GOALS:   Continued/Maintain healthy weight loss with good nutrition intakes.  Adequate hydration with at least 64oz. fluid intake.  Normal vitamin and mineral levels.  Exercise as tolerated.    Follow-up in *** months/year. We kindly ask that your arrive 15 minutes before your scheduled appointment time with your provider to allow our staff to room you, get your vital signs and update your chart.    Follow diet as discussed.      Get lab work done in the next 2 weeks.  You have been given a lab slip today.  Please call the office if you need a replacement.  It is recommended to check with your insurance BEFORE getting labs done to make sure they are covered by your policy.  Also, please check with your PCP and other providers before getting labs to avoid duplicate labs. Make sure to HOLD any multivitamins that may contain biotin and any biotin supplements FOR 5 DAYS before any labs since it can affect the results.    Follow vitamin and mineral recommendations as reviewed with you.    Call our office if you have any problems with abdominal pain  especially associated with fever, chills, nausea, vomiting or any other concerns.    All  Post-bariatric surgery patients should be aware that very small quantities of any alcohol can cause impairment and it is very possible not to feel the effect. The effect can be in the system for several hours.  It is also a stomach irritant.     It is advised to AVOID alcohol, Nonsteroidal antiinflammatory drugs (NSAIDS) and nicotine of all forms . Any of these can cause stomach irritation/pain.

## 2025-02-04 ENCOUNTER — TELEPHONE (OUTPATIENT)
Age: 40
End: 2025-02-04

## 2025-02-12 NOTE — PROGRESS NOTES
Post op support. s/p LRYGB with Dr. Vargas at [facility] about 15 years ago. Dx. Lap and MIGUELITO and reduction of internal hernia with Dr. Devaughn Boogie on 25. Referred by VALENTINE Huang. She had recent visit with her on 25.   Patient reports a very traumatic experience with her recent surgery. Was an emergency- intestines were twisted.  Patient is and . Works full time all different hours- contract. Patient has 11 year old twins- son and daughter. Their dad was a short term relationship and he was never involved. She was living in [place] at the time that they were dating and then when she found out she was pregnant she moved back to [place]. Patient has good support with her parents and her brother. Her brother has 3 children- close with his family.   Had an emergency  with her twins. Ended up having to be put under because the epidural failed.   Her son plays travel football- tournaments all over the country. Daughter does competitive cheer and travels as well, but more local. Her dad helps a lot with the travel and her kids. Kids go to [employer]. Stress with finances- parents help out. Also stress with worrying about her kids well-being in the current climate.   Prescribed hydroxyzine for anxiety by her PCP, but afraid to take it. Worried about weight gain. Suggested talking to her PCP about different options. Discussed how chronic stress can be a barrier to weight loss and weight maintenance. Patient scheduled to follow up with RD next week and ANIYAHW in 1 month.  Kathia Mcadams LCSW

## 2025-02-13 ENCOUNTER — CLINICAL SUPPORT (OUTPATIENT)
Dept: BARIATRICS | Facility: CLINIC | Age: 40
End: 2025-02-13

## 2025-02-13 VITALS — WEIGHT: 146 LBS | BODY MASS INDEX: 26.92 KG/M2

## 2025-02-13 DIAGNOSIS — F41.9 ANXIETY: ICD-10-CM

## 2025-02-13 DIAGNOSIS — Z98.84 BARIATRIC SURGERY STATUS: Primary | ICD-10-CM

## 2025-02-13 PROCEDURE — RECHECK

## 2025-03-11 ENCOUNTER — OFFICE VISIT (OUTPATIENT)
Dept: INTERNAL MEDICINE CLINIC | Facility: CLINIC | Age: 40
End: 2025-03-11
Payer: COMMERCIAL

## 2025-03-11 VITALS
RESPIRATION RATE: 16 BRPM | BODY MASS INDEX: 26.92 KG/M2 | HEIGHT: 62 IN | OXYGEN SATURATION: 98 % | DIASTOLIC BLOOD PRESSURE: 56 MMHG | HEART RATE: 83 BPM | SYSTOLIC BLOOD PRESSURE: 100 MMHG

## 2025-03-11 DIAGNOSIS — Z00.00 ANNUAL PHYSICAL EXAM: Primary | ICD-10-CM

## 2025-03-11 DIAGNOSIS — F41.1 ANXIETY STATE: ICD-10-CM

## 2025-03-11 PROCEDURE — 99395 PREV VISIT EST AGE 18-39: CPT | Performed by: PHYSICIAN ASSISTANT

## 2025-03-11 RX ORDER — FLUOXETINE 10 MG/1
10 TABLET, FILM COATED ORAL DAILY
Qty: 30 TABLET | Refills: 5 | Status: SHIPPED | OUTPATIENT
Start: 2025-03-11

## 2025-03-11 NOTE — PATIENT INSTRUCTIONS
"  Patient with significant anxiety is asking for referral for psych counseling services.  Also agreeable to give trial of fluoxetine 10 mg 1 daily.  We will plan follow-up in 4 to 6 weeks to ascertain effectiveness.  Patient has multiple labs ordered for follow-up with bariatrics at the end of the month.  We will also review these results.              Patient Education     Routine physical for adults   The Basics   Written by the doctors and editors at [facility]   What is a physical? -- A physical is a routine visit, or \"check-up,\" with your doctor. You might also hear it called a \"wellness visit\" or \"preventive visit.\"  During each visit, the doctor will:   Ask about your physical and mental health   Ask about your habits, behaviors, and lifestyle   Do an exam   Give you vaccines if needed   Talk to you about any medicines you take   Give advice about your health   Answer your questions  Getting regular check-ups is an important part of taking care of your health. It can help your doctor find and treat any problems you have. But it's also important for preventing health problems.  A routine physical is different from a \"sick visit.\" A sick visit is when you see a doctor because of a health concern or problem. Since physicals are scheduled ahead of time, you can think about what you want to ask the doctor.  How often should I get a physical? -- It depends on your age and health. In general, for people age 21 years and older:   If you are younger than 50 years, you might be able to get a physical every 3 years.   If you are 50 years or older, your doctor might recommend a physical every year.  If you have an ongoing health condition, like diabetes or high blood pressure, your doctor will probably want to see you more often.  What happens during a physical? -- In general, each visit will include:   Physical exam - The doctor or nurse will check your height, weight, heart rate, and blood pressure. They will also look " "at your eyes and ears. They will ask about how you are feeling and whether you have any symptoms that bother you.   Medicines - It's a good idea to bring a list of all the medicines you take to each doctor visit. Your doctor will talk to you about your medicines and answer any questions. Tell them if you are having any side effects that bother you. You should also tell them if you are having trouble paying for any of your medicines.   Habits and behaviors - This includes:   Your diet   Your exercise habits   Whether you smoke, drink alcohol, or use drugs   Whether you are sexually active   Whether you feel safe at home  Your doctor will talk to you about things you can do to improve your health and lower your risk of health problems. They will also offer help and support. For example, if you want to quit smoking, they can give you advice and might prescribe medicines. If you want to improve your diet or get more physical activity, they can help you with this, too.   Lab tests, if needed - The tests you get will depend on your age and situation. For example, your doctor might want to check your:   Cholesterol   Blood sugar   Iron level   Vaccines - The recommended vaccines will depend on your age, health, and what vaccines you already had. Vaccines are very important because they can prevent certain serious or deadly infections.   Discussion of screening - \"Screening\" means checking for diseases or other health problems before they cause symptoms. Your doctor can recommend screening based on your age, risk, and preferences. This might include tests to check for:   Cancer, such as breast, prostate, cervical, ovarian, colorectal, prostate, lung, or skin cancer   Sexually transmitted infections, such as chlamydia and gonorrhea   Mental health conditions like depression and anxiety  Your doctor will talk to you about the different types of screening tests. They can help you decide which screenings to have. They can also " explain what the results might mean.   Answering questions - The physical is a good time to ask the doctor or nurse questions about your health. If needed, they can refer you to other doctors or specialists, too.  Adults older than 65 years often need other care, too. As you get older, your doctor will talk to you about:   How to prevent falling at home   Hearing or vision tests   Memory testing   How to take your medicines safely   Making sure that you have the help and support you need at home  All topics are updated as new evidence becomes available and our peer review process is complete.  This topic retrieved from [employer] on: May 02, 2024.  Topic 977747 Version 1.0  Release: 32.4.3 - C32.122  © 2024 UpToDate, Inc. and/or its affiliates. All rights reserved.  Consumer Information Use and Disclaimer   Disclaimer: This generalized information is a limited summary of diagnosis, treatment, and/or medication information. It is not meant to be comprehensive and should be used as a tool to help the user understand and/or assess potential diagnostic and treatment options. It does NOT include all information about conditions, treatments, medications, side effects, or risks that may apply to a specific patient. It is not intended to be medical advice or a substitute for the medical advice, diagnosis, or treatment of a health care provider based on the health care provider's examination and assessment of a patient's specific and unique circumstances. Patients must speak with a health care provider for complete information about their health, medical questions, and treatment options, including any risks or benefits regarding use of medications. This information does not endorse any treatments or medications as safe, effective, or approved for treating a specific patient. UpToDate, Inc. and its affiliates disclaim any warranty or liability relating to this information or the use thereof.The use of this information is  governed by the Terms of Use, available at https://www.[employer].com/en/know/clinical-effectiveness-terms. 2024© [employer] and its affiliates and/or licensors. All rights reserved.  Copyright   © 2024 [employer] and/or its affiliates. All rights reserved.

## 2025-03-11 NOTE — PROGRESS NOTES
Adult Annual Physical  Name: Shirley Barton      : 1985      MRN: [id number]  Encounter Provider: eJrrod Oliveros PA-C  Encounter Date: 3/11/2025   Encounter department: [facility] INTERNAL MEDICINE [place]    Assessment & Plan  Annual physical exam           Preventive Screenings:  - Diabetes Screening: screening up-to-date  - Cholesterol Screening: screening up-to-date   - Hepatitis C screening: screening up-to-date   - HIV screening: screening up-to-date   - Cervical cancer screening: screening up-to-date   - Colon cancer screening: screening not indicated   - Lung cancer screening: screening not indicated   - Prostate cancer screening: screening not indicated     Immunizations:  - Immunizations due: Influenza and Tdap    Counseling/Anticipatory Guidance:  - Alcohol: discussed moderation in alcohol intake and recommendations for healthy alcohol use.   - Drug use: discussed harms of illicit drug use and how it can negatively impact mental/physical health.   - Dental health: discussed importance of regular tooth brushing, flossing, and dental visits.   - Sexual health: discussed sexually transmitted diseases, partner selection, use of condoms, avoidance of unintended pregnancy, and contraceptive alternatives.   - Diet: discussed recommendations for a healthy/well-balanced diet.   - Exercise: the importance of regular exercise/physical activity was discussed. Recommend exercise 3-5 times per week for at least 30 minutes.   - Injury prevention: discussed safety/seat belts, safety helmets, smoke detectors, carbon monoxide detectors, and smoking near bedding or upholstery.       Depression Screening and Follow-up Plan: Patient was screened for depression during today's encounter. They screened negative with a PHQ-2 score of 0.        History of Present Illness     Adult Annual Physical:  Patient presents for annual physical. 39-year-old female presents for her annual physical.  Overall still recuperating from  internal hernia surgery repair done in January.  Patient stating that she is still having difficulty with constipation.  Discussion held with the patient indicates that she is not taking the MiraLAX appropriately.  She will start doing it correctly and see if this is helpful.  Patient indicates that she has a history of iron deficiency, is recently anemic and feeling tired.  Most recent CBC in the EMR does show patient to be anemic with low RBC indices.  She does have iron studies plus multiple bariatric studies ordered for an upcoming bariatric appointment at the end of the month.  She was encouraged to get these done.  She is intolerant oral iron secondary to constipation.  It also seems to cause abdominal pain.    Patient also stating that her anxiety has been very high.  It had been recommended by the bariatric  that she seek counseling and possibly try medication.  Patient mentioned fluoxetine and sertraline.  We discussed medications.  After discussion we felt that a trial of fluoxetine may be best as it probably causes less weight gain then sertraline.  We also have agreed to see if she can be seen by our mental health provider.    EMR has been reviewed, clarified, and updated with patient today..     Diet and Physical Activity:  - Diet/Nutrition: well balanced diet, limited junk food, adequate whole grain intake and consuming 3-5 servings of fruits/vegetables daily.  - Exercise: moderate cardiovascular exercise, strength training exercises and 1-2 times a week on average.    Depression Screening:  - PHQ-2 Score: 0    General Health:  - Sleep: sleeps poorly and 4-6 hours of sleep on average.  - Hearing: normal hearing bilateral ears.  - Vision: most recent eye exam > 1 year ago. noting some decrease in vision  - Dental: regular dental visits and brushes teeth twice daily.    /GYN Health:  - Follows with GYN: yes.   - Menopause: premenopausal.   - History of STDs: no    Advanced Care  "Planning:  - Has an advanced directive?: no    - Has a durable medical POA?: no    - ACP document given to patient?: no      Review of Systems   Constitutional:  Positive for fatigue. Negative for activity change, chills and fever.   HENT:  Negative for congestion.    Eyes:  Negative for discharge.   Respiratory:  Negative for cough, chest tightness and shortness of breath.    Cardiovascular:  Negative for chest pain, palpitations and leg swelling.   Gastrointestinal:  Positive for constipation. Negative for abdominal pain, blood in stool, diarrhea, nausea and vomiting.   Endocrine: Negative for polydipsia, polyphagia and polyuria.   Genitourinary:  Negative for difficulty urinating.   Musculoskeletal:  Negative for arthralgias and myalgias.   Skin:  Negative for rash.   Allergic/Immunologic: Negative for immunocompromised state.   Neurological:  Negative for dizziness, syncope, weakness, light-headedness and headaches.   Hematological:  Negative for adenopathy. Does not bruise/bleed easily.   Psychiatric/Behavioral:  Positive for sleep disturbance. Negative for agitation, confusion, decreased concentration, dysphoric mood and suicidal ideas. The patient is nervous/anxious.          Objective   /56 (BP Location: Left arm, Patient Position: Sitting, Cuff Size: Adult)   Pulse 83   Resp 16   Ht 5' 1.75\" (1.568 m)   SpO2 98%   BMI 26.92 kg/m²     Physical Exam  Vitals and nursing note reviewed.   Constitutional:       General: She is not in acute distress.     Appearance: She is well-developed. She is not ill-appearing.      Comments: Well-developed, well-nourished 39-year-old female appearing about stated age in no acute distress.   HENT:      Head: Normocephalic and atraumatic.      Right Ear: Ear canal and external ear normal.      Left Ear: Tympanic membrane, ear canal and external ear normal.      Ears:      Comments: Right tympanic membrane appeared slightly dull compared to left     Nose: Nose normal. "      Mouth/Throat:      Mouth: Mucous membranes are moist.      Pharynx: Oropharynx is clear. No oropharyngeal exudate.   Eyes:      Extraocular Movements: Extraocular movements intact.      Conjunctiva/sclera: Conjunctivae normal.      Pupils: Pupils are equal, round, and reactive to light.   Neck:      Thyroid: No thyromegaly.      Vascular: No carotid bruit or JVD.   Cardiovascular:      Rate and Rhythm: Normal rate and regular rhythm.      Heart sounds: Normal heart sounds. No murmur heard.  Pulmonary:      Effort: Pulmonary effort is normal.      Breath sounds: Normal breath sounds.   Chest:      Chest wall: No tenderness.   Abdominal:      General: Bowel sounds are normal.      Palpations: Abdomen is soft. There is no hepatomegaly, splenomegaly or mass.      Tenderness: There is no abdominal tenderness. There is no right CVA tenderness or left CVA tenderness.   Musculoskeletal:         General: No swelling or tenderness. Normal range of motion.      Cervical back: Normal range of motion and neck supple.      Right lower leg: No edema.      Left lower leg: No edema.   Lymphadenopathy:      Cervical: No cervical adenopathy.   Skin:     General: Skin is warm and dry.      Findings: No rash.   Neurological:      General: No focal deficit present.      Mental Status: She is alert and oriented to person, place, and time. Mental status is at baseline.      Cranial Nerves: No cranial nerve deficit.      Sensory: No sensory deficit.      Motor: No weakness.      Coordination: Coordination normal.      Gait: Gait normal.      Deep Tendon Reflexes: Reflexes normal.   Psychiatric:         Mood and Affect: Mood normal.         Behavior: Behavior normal.         Thought Content: Thought content normal.         Judgment: Judgment normal.

## 2025-03-18 ENCOUNTER — TELEPHONE (OUTPATIENT)
Age: 40
End: 2025-03-18

## 2025-03-18 NOTE — PROGRESS NOTES
Post op support. Started on Prozac by her PCP since 3/11. PCP also referred her to Karin Kennedy- waiting to hear back. Daughter goes to the gym 4 days per week- 2 of the days is 4pm-9pm. She said next year she wants to only do 1 team instead of 2. Son doing baseball and he also does greek dancing. Her ex has been in and out of her life causing a lot of stress. Struggles to set boundaries for self care. Always trying to make everyone else happy. Her son is on a hormonal medication until he is 13 because he started puberty too early. Wants to move to [place], but feels that her life has been on hold since she got pregnant with her twins and their dad left. Something always comes up. Doesn't always have an appetite, so she forces herself to eat. Drinking about 32 oz of water, 24 oz diet iced, occasionally coffee. Encouraged her to increase non-caffeine fluids. Work on being mindful of working through the feeling of guilt that occurs when she tries to set boundaries. Add in something that will bring her koby. Patient was unable to even identify something that she is interested in.  Kathia Mcadams LCSW

## 2025-03-18 NOTE — TELEPHONE ENCOUNTER
Patient called in to reschedule appt with Dr Dahl, please call her.  She also was wondering if she needed to see the surgical dietician again.

## 2025-03-19 ENCOUNTER — CLINICAL SUPPORT (OUTPATIENT)
Dept: BARIATRICS | Facility: CLINIC | Age: 40
End: 2025-03-19

## 2025-03-19 ENCOUNTER — TELEPHONE (OUTPATIENT)
Age: 40
End: 2025-03-19

## 2025-03-19 DIAGNOSIS — Z98.84 BARIATRIC SURGERY STATUS: Primary | ICD-10-CM

## 2025-03-19 DIAGNOSIS — F41.9 ANXIETY: ICD-10-CM

## 2025-03-19 PROCEDURE — RECHECK

## 2025-03-19 NOTE — TELEPHONE ENCOUNTER
Patient has been added to the Medication Management and Talk Therapy wait list with a referral.    Insurance: jonathon ORTIZ  Insurance Type:    Commercial []   Medicaid [x]   [facility] (if applicable)   Medicare []  Location Preference:   Provider Preference: any  Virtual: Yes [] No []  Were outside resources sent: Yes [] No [x]  anxiety

## 2025-03-19 NOTE — TELEPHONE ENCOUNTER
Pt called stating she received a referral to see Karin Kennedy but did not get a call to schedule.  Pt was driving so a soft transfer was done to connect her to scheduling.

## 2025-03-21 ENCOUNTER — TELEPHONE (OUTPATIENT)
Dept: BARIATRICS | Facility: CLINIC | Age: 40
End: 2025-03-21

## 2025-03-25 ENCOUNTER — TELEPHONE (OUTPATIENT)
Age: 40
End: 2025-03-25

## 2025-03-25 NOTE — TELEPHONE ENCOUNTER
Contacted patient regarding ROUTINE referral. Patient wanted to schedule an appointment with Karin Kennedy. Made patient aware we can place her on wait list as there is no availability at this time. Sending outside resource packet via [facility]. Patient verbalized understanding.

## 2025-03-25 NOTE — LETTER
[facility] Behavioral Health Resource Guide    Dear valued patient - It is the policy and practice of [facility] to provide quality care that includes a comprehensive discharge plan. Every patient admitted to one of our Behavioral Health Units is assigned a Case   Manager who completes a thorough assessment, discusses your aftercare needs, and works with you to implement   a plan that meets your needs.     This is a process that takes time and our Case Management department begins this process immediately upon admission and when patients are able to participate in the process. We understand that there are times when a   patient desires or requires to be discharged before this process can be completed. As we are dedicated to promoting   continued care and treatment, we have compiled this resource guide to assist you with obtaining the necessary   follow-up that you may need. Your physician and/or nursing team will provide you with the appropriate list(s)   and recommendations for care.     Please make sure to schedule an appointment with your Primary Care Physician as soon as their office is open.      If you have an ICM, , or ACT team, please notify them prior to your discharge or as soon as   you return home.     If you already have a Psychiatrist and/or therapist, please call them immediately to schedule a follow-up   Appointment.      Please make sure to inform any and all of your providers that you were hospitalized so that they may request   records for continuity of care.      If you do not have any providers, please refer to the lists provided to you to arrange appointments.     Thank you for choosing [facility] for your care.    Best wishes,  The Behavioral Health Case Management Team          Substance Abuse Resources - If you or someone you care about struggle with substance abuse, there is help:  [facility]: There is a [facility] Drug and Alcohol  Office that you can call 8:00 a.m. till 4:00 p.m. to [phone]        or email [email or web address], 24 hours a day, that can help  walk you through your options for getting help.     [facility]: [phone]      [facility]: [phone] or [phone] after hours    [facility]: [phone]    [facility]: [phone]    [facility]: [phone]    If you do not have health insurance and are in financial need, these offices may also be able help you in accessing funding for services.    If you have health insurance, including medical assistance, there should be a phone number on your insurance card that you can all to find out how to access services. The card may say, “For Behavioral Services” or “For Drug and Alcohol Services” or “For Substance Abuse Services” call the number provided.     If you still need help, you can call the [place] Department of Drug and Alcohol Programs: [phone].    National Suicide Prevention Hotline - 882.806.4868   Nationwide Crisis Intervention - 213.802.4654  Alcoholics Anonymous - [phone]  Narcotics Anonymous - [phone]      [facility] Crisis and Mental Health Services    [facility]: [phone]  [facility]: [phone] / [phone] (302, business hours)  [phone] (off hours)  [facility]: [phone] /  [phone] (D&A) /  [phone] (Crisis)  [facility]: [phone] (Crisis) /  [phone] (MR)  [facility]: [phone] /  [phone] (Crisis)  [facility]: [phone] (Crisis) /  [phone] (MH/MR)  [facility]: [phone] /  [phone] (Crisis)    Food [place]/ Resources    [employer] (Food Bank Lists)  [phone]    [facility]      Food box = $30     [phone] - [place]  1 Box = Family of 4 for - 1 week   [phone] - [place]  1 Box = 1 senior citizen - 1 month   [phone] - [place]  Food stamps accepted    [phone] - [place]  No  applications/qualifications   [phone] - [place]  No purchase limits for boxes   [phone] - [place]  [facility] locations    [phone] - [place]      Shelters (if you are homeless, please call 211 and register yourself for emergency shelter)    [facility] (Adult men only)  [address] [place] [address]  [phone] / [phone] (after hours)    [facility] (Adult men and women only)  [address] [place] [address]  [phone]    [facility] (85 spots, M/W/C)  [address] [place] [address] [phone]    [facility] (144 spots, M/W/C)  [address] [address]  [phone]    [facility] (W/C, boys 5 or under)  [address] [place], [place] [id number] [phone]                      Outpatient Therapists, Psychiatrists, and Partial Programs    [facility] Partial Program / [facility] (Partial)  [facility]  [address] [place] [address]     or  [address] [place] [address] [phone]    [facility] Intake and Referral Line (Outpatient)  [address] [place] [address]  [phone]    [facility] (Dr Davis) (Outpatient Psych)  [address] [address]  [phone]    [facility] Counseling Services (Outpatient)  [address]  [address]  [phone]    [facility] (Outpatient *No Psychiatrist, Counseling only)  [address] [place] [address]  [phone]    [facility] (Outpatient)  [address] [place]  [address]   [phone]    [facility] (Outpatient)  [address] [place] [address]  [phone]    [facility] (Outpatient)  [address] [facility]  [phone]    [facility] (Adolescents - Psychologist / eating  disorders)  [address] [facility] [place]  [phone]           Outpatient Therapists, Psychiatrists, and Partial Programs     [facility] (Outpatient D&A)  [address] [place] [address]  [phone]    [facility] (Children, Adolescents, Adults)   O/P MH Therapy ([place] / Medicare / private)  [address] [address] [phone]    [facility] (Children, Adolescents, Adults)  O/P MH Services ([place] / Medicare / Private)  [address] [place] [address]  [phone]    [facility] (Outpatient)  [address] [place] [address]  [phone]    [facility] (Outpatient)  [address] [place] [address]  [phone]     [facility]  [address] [place] [address]  [phone]    [facility] (Outpatient)  [address] [facility] [address], [place] [address]  [phone]    [facility] (Outpatient)  [address] [place] [address]  [phone]    [facility], [facility] (Outpatient)  [address] [place] [address] [phone]    [facility] (Outpatient)   [address] [place] [address]  [phone]          Outpatient Therapists, Psychiatrists, and Partial Programs    [facility] (Outpatient - Uninsured)  [address] [address])  [phone]    [facility] (Outpatient)  [address], [place]  [phone]    [facility] () (Outpatient)  [address], [place], [place]  [phone]    [facility] Adult Partial Transitions (Partial)  [address] [facility] [address], [place] [address]  [phone] or [phone]    [facility] (Outpatient)  [address] [place], [place] [id number] [phone]    [facility] (Outpatient)  [address] [place] [address]  [phone]    [facility] (Adults)  (OP Therapy, PTSD /Substance abuse) (MA/Medicare/Private)  [address] [facility], [place] [address]  [phone]    [facility] (Outpatient)  [address] [place] [address]  [phone]    [facility] (Outpatient - Uninsured)  [address] [facility] [place], [address]  [phone]    [facility] (Outpatient)  [address] [place] [address]  [phone]    [facility] (Outpatient)  [address] [facility] [place] [address]  [phone]    Outpatient Therapists, Psychiatrists, and Partial Programs    [facility]  [address] [place] [address]  [phone]    [facility] (Private Ins/[place]. No Psychiatrists) (Children/Adolescent/Adult)  [address] [place] [address]  [phone]    [facility] (Outpatient, [place])  [address] [place], [place]  [phone]    [facility] (Outpatient, [place])  [address] [place], [place]  [phone]    [facility] (Outpatient, [place])  [address] [place], [place]  [phone]    [facility] (Kids/Adults) Walk-Ins Available (Outpatient)  [address] [facility], [address], [place] [address]  [phone]    [facility] (Outpatient / Partial) (Uninsured, Medicaid or Private)  [address] [place], [place], [address] [phone]    [facility] (Children, Adolescent, Adult) O/P MH Therapy)  [address], [place] [address] [phone]    [facility] (Children, Adolescent, Adult) O/P MH Services ([place], Medicare, Private) (No [place]BS of [place])  [address] [place], [place], [address] [phone]    [place] [facility] (Outpatient)  [address] [place]  [place] Manjarrez, [address]  [phone]    Outpatient  Therapists, Psychiatrists, and Partial Programs    [facility] (Outpatient)  [address] [place] [address]  [phone]    [facility] (Inpatient and Outpatient)  [address] [facility] [address], [place] [address]  [phone]    [facility] (Children 5+, Adolescent, Adult)   (O/P, MH Services - [place] only)  [address] [facility], [address], [place] [address]  [phone]    [facility] (Children 5+, Adolescent, Adult)  (O/P MH Services/Psych?Rehab/Peer Supports) ([place] Only)  [address] [facility], 2nd Floor, [place], [place]  [phone]    [facility] (MH/MR Outpatient)  [address] [place], [address]  [phone] or [phone]    [facility] (Outpatient)  [address] [facility], [place], [address]  [phone]    [facility] (Outpatient & Behavioral Services)  [address] [place] [address]  [phone]    [facility] (Outpatient Therapy) ([place], Medicare, Private Ins)  [address] [facility], [address], [place] [address]  [phone]                      [facility] Department of Human [facility] ([facility] Mental Health Board)  [address] [place] [address] [phone]    [facility] (Integrated Case Management Services)  [address] [id number] [phone]    [facility] Services (Outpatient/Partial Care)  [address] [place] [phone]    [facility] [place] for [facility] Services (Intensive Outpatient Treatment and Support Services)  [address] [place], [place] [id number] [phone]    [facility] Services (System Advocacy)  [address] [place], [place] [address] [phone]    [facility] (Community Support Services)  [address] [id number] [phone]                                        [facility] of  Human Services ([facility] Mental Health Board)  [address] [place] [address], [address]  [phone]    [facility] (Integrated Case Management Services)  [facility] [place] [address]  [phone]    [facility]  [address] [place] [address]  [phone]    [facility] (Intensive Outpatient Treatment and Support Services)  [address] [place] [id number] [phone]    [facility] - Behavioral Health (Outpatient/Partial Care)  [address] [place] [address] [phone] or [phone]

## 2025-03-28 NOTE — PROGRESS NOTES
"Bariatric Nutrition Assessment & Education Note      s/p LRYGB with Dr. Paulson at [facility] about 15 years ago. Dx. Lap and MIGUELITO and reduction of internal hernia with Dr. Devaughn Boogie on 01/25/25.     Height: 5\"1.75\"    Last Weight: 146#   BMI: 26.9  Weight Prior to Weight Loss Surgery: 270  Weight in (lb) to have BMI = 25: 136  COMFORT:135 - maintained at 145 - would like to be at 135#  Regain: 10#      Energy Requirements  Ezequiel Lawler Equation:    BMR= 1293 calories  Weight Maintenance 1550 calories  Estimated calories for weight loss 5511-7733  ( 1/2-1# per wk wt loss - sedentary )  Estimated protein needs 62-93 grams(1.0-1.5 gms/kg BMI at 25 )   Estimated fluid needs 62-72 oz (30-35 ml/kg BMI at 25)        Vitamin Regimen: Recommended bariatric MVI with iron and calcium citrate 500mg TID  Pt taking iron, Vitamin B12 and C    May need iron infusions - to complete Bloodwork prior to appt with Reina on 5/9/25    Diet and exercise:    Tolerating a regular diet - Yes  3 meals:  Yes  Snacking/grazing Snacks chocolate covered banana  Volume: 3-4 oz fish 1/2 c veg and 1-2 tbsp starch (8-10 oz)  Eating at least 60 grams of protein per day-Yes   Protein drinks:  Pure Protein  Following 30/60 minute rule with liquids- not always   Eating/drinking: chewing food well- sipping fluids  Drinking at least 64 ounces of fluid per day- Yes  Drinking carbonated beverages-no  Food logging- not recently  GI discomforts  Exercise: Gym - mainly cardio - to add strength  Other: works as  . Has 12 yo Twins (son & daughter)  at [employer].    Diet Recall:- Pescatarian   B - tuna on rice cake with cheese  L - tuna on rice cake with cheese and lettuce  D - fish or tofu and veggies or green salad or pizza   Snacks chocolate covered banana (100 calories) -[place] nut crackers/cheese- Jaylon, orange  Hummus with raw veg  Fluids - 80oz + water, unsweet tea, rare soy or almond milk    Visit Summary 3/31/2025  15 years post op. Had recent " emergency surgery with Dr Santana and now establishing care at [facility] for post bariatric support. Pt did well with her weight loss and has maintained with recent 10# gain which pt would like to lose. Reviewed vitamin regimen and options. Provided samples. Discussed her fatigue r/t iron deficiency. Pt may need infusions. Pt to discuss with Reina in May based on review of her upcoming bloodwork. Reviewed post op guidelines which pt follows for the most except for 30/60 rule. Reviewed calculated energy needs and advised on balance of macros. Pt to restart food logging to achieve goals. Advised 1200 calories for gradual wt loss. Discussed protein drink options if protein needs not met. Questions/concerns addressed during discussion. Pt receptive    Goals   Keep to post op guidelines  Maintain  protein (70-80 grams)  Maintain hydration ( >=64 oz )  Avoid grazing - continue to have snacks with protein  Volume at 1-1.25 c per meals - Macro goals as discussed above  Start vitamin regimen as advised ( Andry Multi with 45 mg Iron and 1500 mg Calcium) - May need iron infusions  Increase physical activity and exercise regimen as tolerated  F/U Surgical RD as needed    Time Spent: 45 minutes

## 2025-03-31 ENCOUNTER — CLINICAL SUPPORT (OUTPATIENT)
Dept: BARIATRICS | Facility: CLINIC | Age: 40
End: 2025-03-31

## 2025-03-31 DIAGNOSIS — K91.2 POSTSURGICAL MALABSORPTION: Primary | ICD-10-CM

## 2025-03-31 PROCEDURE — RECHECK

## 2025-04-01 DIAGNOSIS — F41.1 ANXIETY STATE: ICD-10-CM

## 2025-04-01 RX ORDER — FLUOXETINE 10 MG/1
20 TABLET, FILM COATED ORAL DAILY
Qty: 60 TABLET | Refills: 5 | Status: SHIPPED | OUTPATIENT
Start: 2025-04-01

## 2025-04-08 DIAGNOSIS — F41.1 ANXIETY STATE: ICD-10-CM

## 2025-04-10 RX ORDER — FLUOXETINE 10 MG/1
20 TABLET, FILM COATED ORAL DAILY
Qty: 60 TABLET | Refills: 0 | OUTPATIENT
Start: 2025-04-10

## 2025-04-11 ENCOUNTER — TELEPHONE (OUTPATIENT)
Dept: INTERNAL MEDICINE CLINIC | Facility: CLINIC | Age: 40
End: 2025-04-11

## 2025-04-11 NOTE — LETTER
April 11, 2025     Patient: Shirley Barton  YOB: 1985      To Whom it May Concern:    Shirley Barton is under my professional care. Shirley was seen in my office on 3/11/2025. Shirley does have an active diagnosis of anxiety and depression.     If you have any questions or concerns, please don't hesitate to call.         Sincerely,          Jerrod Oliveros PA-C        CC: No Recipients

## 2025-04-11 NOTE — TELEPHONE ENCOUNTER
Patient is applying for rental assistance and they are requesting a letter from her PCP stating she has anxiety and depression. She does have an appointment on 04/30.    Please advise...    Call back #[phone]

## 2025-04-29 ENCOUNTER — APPOINTMENT (OUTPATIENT)
Age: 40
End: 2025-04-29
Payer: COMMERCIAL

## 2025-04-29 DIAGNOSIS — K91.2 POSTSURGICAL MALABSORPTION: ICD-10-CM

## 2025-04-29 LAB
25(OH)D3 SERPL-MCNC: 56.3 NG/ML (ref 30–100)
ALBUMIN SERPL BCG-MCNC: 4.1 G/DL (ref 3.5–5)
ALP SERPL-CCNC: 70 U/L (ref 34–104)
ALT SERPL W P-5'-P-CCNC: 42 U/L (ref 7–52)
ANION GAP SERPL CALCULATED.3IONS-SCNC: 10 MMOL/L (ref 4–13)
AST SERPL W P-5'-P-CCNC: 36 U/L (ref 13–39)
BASOPHILS # BLD AUTO: 0.12 THOUSANDS/ÂΜL (ref 0–0.1)
BASOPHILS NFR BLD AUTO: 2 % (ref 0–1)
BILIRUB SERPL-MCNC: 0.34 MG/DL (ref 0.2–1)
BUN SERPL-MCNC: 14 MG/DL (ref 5–25)
CALCIUM SERPL-MCNC: 9.1 MG/DL (ref 8.4–10.2)
CHLORIDE SERPL-SCNC: 104 MMOL/L (ref 96–108)
CHOLEST SERPL-MCNC: 182 MG/DL (ref ?–200)
CO2 SERPL-SCNC: 27 MMOL/L (ref 21–32)
CREAT SERPL-MCNC: 0.68 MG/DL (ref 0.6–1.3)
EOSINOPHIL # BLD AUTO: 0.31 THOUSAND/ÂΜL (ref 0–0.61)
EOSINOPHIL NFR BLD AUTO: 4 % (ref 0–6)
ERYTHROCYTE [DISTWIDTH] IN BLOOD BY AUTOMATED COUNT: 18.4 % (ref 11.6–15.1)
EST. AVERAGE GLUCOSE BLD GHB EST-MCNC: 117 MG/DL
FERRITIN SERPL-MCNC: 3 NG/ML (ref 30–307)
FOLATE SERPL-MCNC: 13 NG/ML
GFR SERPL CREATININE-BSD FRML MDRD: 112 ML/MIN/1.73SQ M
GLUCOSE P FAST SERPL-MCNC: 85 MG/DL (ref 65–99)
HBA1C MFR BLD: 5.7 %
HCT VFR BLD AUTO: 32.8 % (ref 34.8–46.1)
HDLC SERPL-MCNC: 102 MG/DL
HGB BLD-MCNC: 10.1 G/DL (ref 11.5–15.4)
IMM GRANULOCYTES # BLD AUTO: 0.02 THOUSAND/UL (ref 0–0.2)
IMM GRANULOCYTES NFR BLD AUTO: 0 % (ref 0–2)
IRON SATN MFR SERPL: 3 % (ref 15–50)
IRON SERPL-MCNC: 15 UG/DL (ref 50–212)
LDLC SERPL CALC-MCNC: 72 MG/DL (ref 0–100)
LYMPHOCYTES # BLD AUTO: 2.46 THOUSANDS/ÂΜL (ref 0.6–4.47)
LYMPHOCYTES NFR BLD AUTO: 33 % (ref 14–44)
MCH RBC QN AUTO: 22.9 PG (ref 26.8–34.3)
MCHC RBC AUTO-ENTMCNC: 30.8 G/DL (ref 31.4–37.4)
MCV RBC AUTO: 74 FL (ref 82–98)
MONOCYTES # BLD AUTO: 0.59 THOUSAND/ÂΜL (ref 0.17–1.22)
MONOCYTES NFR BLD AUTO: 8 % (ref 4–12)
NEUTROPHILS # BLD AUTO: 3.88 THOUSANDS/ÂΜL (ref 1.85–7.62)
NEUTS SEG NFR BLD AUTO: 53 % (ref 43–75)
NONHDLC SERPL-MCNC: 80 MG/DL
NRBC BLD AUTO-RTO: 0 /100 WBCS
PLATELET # BLD AUTO: 288 THOUSANDS/UL (ref 149–390)
PMV BLD AUTO: 11.2 FL (ref 8.9–12.7)
POTASSIUM SERPL-SCNC: 3.9 MMOL/L (ref 3.5–5.3)
PROT SERPL-MCNC: 6.4 G/DL (ref 6.4–8.4)
PTH-INTACT SERPL-MCNC: 33.9 PG/ML (ref 12–88)
RBC # BLD AUTO: 4.41 MILLION/UL (ref 3.81–5.12)
SODIUM SERPL-SCNC: 141 MMOL/L (ref 135–147)
TIBC SERPL-MCNC: 471.8 UG/DL (ref 250–450)
TRANSFERRIN SERPL-MCNC: 337 MG/DL (ref 203–362)
TRIGL SERPL-MCNC: 41 MG/DL (ref ?–150)
TSH SERPL DL<=0.05 MIU/L-ACNC: 2.43 UIU/ML (ref 0.45–4.5)
UIBC SERPL-MCNC: 457 UG/DL (ref 155–355)
VIT B12 SERPL-MCNC: 1479 PG/ML (ref 180–914)
WBC # BLD AUTO: 7.38 THOUSAND/UL (ref 4.31–10.16)

## 2025-04-29 PROCEDURE — 82728 ASSAY OF FERRITIN: CPT

## 2025-04-29 PROCEDURE — 83540 ASSAY OF IRON: CPT

## 2025-04-29 PROCEDURE — 83550 IRON BINDING TEST: CPT

## 2025-04-30 ENCOUNTER — OFFICE VISIT (OUTPATIENT)
Dept: INTERNAL MEDICINE CLINIC | Facility: CLINIC | Age: 40
End: 2025-04-30
Payer: COMMERCIAL

## 2025-04-30 ENCOUNTER — RESULTS FOLLOW-UP (OUTPATIENT)
Dept: BARIATRICS | Facility: CLINIC | Age: 40
End: 2025-04-30

## 2025-04-30 VITALS
BODY MASS INDEX: 27.42 KG/M2 | SYSTOLIC BLOOD PRESSURE: 106 MMHG | RESPIRATION RATE: 17 BRPM | OXYGEN SATURATION: 98 % | HEART RATE: 65 BPM | DIASTOLIC BLOOD PRESSURE: 70 MMHG | HEIGHT: 62 IN | WEIGHT: 149 LBS

## 2025-04-30 DIAGNOSIS — D50.9 IDA (IRON DEFICIENCY ANEMIA): ICD-10-CM

## 2025-04-30 DIAGNOSIS — D50.9 IRON DEFICIENCY ANEMIA, UNSPECIFIED IRON DEFICIENCY ANEMIA TYPE: ICD-10-CM

## 2025-04-30 DIAGNOSIS — K91.2 POSTSURGICAL MALABSORPTION: Primary | ICD-10-CM

## 2025-04-30 DIAGNOSIS — D50.9 IDA (IRON DEFICIENCY ANEMIA): Primary | ICD-10-CM

## 2025-04-30 DIAGNOSIS — F41.1 ANXIETY STATE: Primary | ICD-10-CM

## 2025-04-30 DIAGNOSIS — K91.2 POSTSURGICAL MALABSORPTION: ICD-10-CM

## 2025-04-30 PROBLEM — R10.9 ABDOMINAL PAIN: Status: RESOLVED | Noted: 2025-01-25 | Resolved: 2025-04-30

## 2025-04-30 PROCEDURE — 99213 OFFICE O/P EST LOW 20 MIN: CPT | Performed by: PHYSICIAN ASSISTANT

## 2025-04-30 RX ORDER — FLUOXETINE 20 MG/1
20 TABLET, FILM COATED ORAL DAILY
Qty: 30 TABLET | Refills: 5 | Status: SHIPPED | OUTPATIENT
Start: 2025-04-30

## 2025-04-30 RX ORDER — SODIUM CHLORIDE 9 MG/ML
20 INJECTION, SOLUTION INTRAVENOUS ONCE
OUTPATIENT
Start: 2025-05-09

## 2025-04-30 NOTE — PROGRESS NOTES
Name: Shirley Barton      : 1985      MRN: [id number]  Encounter Provider: Jerrod Oliveros PA-C  Encounter Date: 2025   Encounter department: [facility] INTERNAL MEDICINE [place]  :  Assessment & Plan  Anxiety state    Orders:    FLUoxetine 20 MG tablet; Take 1 tablet (20 mg total) by mouth daily    Iron deficiency anemia, unspecified iron deficiency anemia type                    History of Present Illness   Follow-up    At last visit patient was put on fluoxetine for anxiety.  She states she took it for about 4 weeks but states she actually felt worse.  She was feeling more depressed, more stressed, thoughts were racing, she was feeling very fatigued, anxious and not herself.  She did contact us at that time and I asked her to increase the fluoxetine to 20 mg daily.  Since doing so she is feeling better with exception of fatigue.  Since that time she has seen weight management, labs present in the EMR do show low iron.  There is communication from weight management and that they are setting her up for iron infusions.  I am sure that the infusions will help her current complaint of fatigue.  She is in agreement with me at this time to continue the fluoxetine at 20 mg daily.  She also would like to start counseling.      Review of Systems   Constitutional:  Negative for activity change, chills, fatigue and fever.   HENT:  Negative for congestion.    Eyes:  Negative for discharge.   Respiratory:  Negative for cough, chest tightness and shortness of breath.    Cardiovascular:  Negative for chest pain, palpitations and leg swelling.   Gastrointestinal:  Negative for abdominal pain, blood in stool, constipation, diarrhea, nausea and vomiting.   Endocrine: Negative for polydipsia, polyphagia and polyuria.   Genitourinary:  Negative for difficulty urinating.   Musculoskeletal:  Negative for arthralgias and myalgias.   Skin:  Negative for rash.   Allergic/Immunologic: Negative for immunocompromised state.  "  Neurological:  Negative for dizziness, syncope, weakness, light-headedness and headaches.   Hematological:  Negative for adenopathy. Does not bruise/bleed easily.   Psychiatric/Behavioral:  Negative for dysphoric mood, sleep disturbance and suicidal ideas. The patient is not nervous/anxious.        Objective   /70 (BP Location: Left arm, Patient Position: Sitting, Cuff Size: Adult)   Pulse 65   Resp 17   Ht 5' 1.75\" (1.568 m)   Wt 67.6 kg (149 lb)   SpO2 98%   BMI 27.47 kg/m²      Physical Exam  Constitutional:       General: She is not in acute distress.     Appearance: Normal appearance.   HENT:      Head: Normocephalic.   Eyes:      Pupils: Pupils are equal, round, and reactive to light.   Neck:      Thyroid: No thyromegaly.      Vascular: No carotid bruit.      Trachea: Trachea normal.   Cardiovascular:      Rate and Rhythm: Normal rate and regular rhythm.      Heart sounds: No murmur heard.  Pulmonary:      Effort: Pulmonary effort is normal. No respiratory distress.      Breath sounds: Normal breath sounds.   Musculoskeletal:         General: No swelling.      Cervical back: Neck supple.      Right lower leg: No edema.      Left lower leg: No edema.   Lymphadenopathy:      Cervical: No cervical adenopathy.   Skin:     General: Skin is warm and dry.      Findings: No rash.   Neurological:      General: No focal deficit present.      Mental Status: She is alert and oriented to person, place, and time. Mental status is at baseline.   Psychiatric:         Mood and Affect: Mood normal.         Behavior: Behavior normal.         "

## 2025-04-30 NOTE — PATIENT INSTRUCTIONS
Continue our current dose of fluoxetine.  Follow-up with weight management as scheduled.  Your iron is low and I am sure that is why you are feeling fatigued.  Once that is corrected I think you are going to be feeling much better.  Plan follow-up here to review in 2 months, sooner as needed.

## 2025-04-30 NOTE — RESULT ENCOUNTER NOTE
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 I am placing orders for the infusion center to administer IV venofer iron weekly x 5 treatments. Please advise the patient of the potential side effects of IV Venofer, including, but not limited to nausea, headache, hypotension, tattooing of the skin, and anaphylactic reaction.  Please call the infusion center to notify them of the orders so they can obtain insurance prior-authorization and help schedule the patient asap. I have entered repeat CBC and iron panel and if needed B12 labs to be repeated in 3 months. Thank you!    Infusion Center numbers:  [facility]:[phone] option 2

## 2025-05-01 LAB — ZINC SERPL-MCNC: 48 UG/DL (ref 44–115)

## 2025-05-01 NOTE — PROGRESS NOTES
"Post op support. PCP increased Prozac. Was not able to start seeing Karin Kennedy because she does not take her insurance. Iron levels low- ferritin (3) with recent labs. VALENTINE Huang ordered iron infusions x5. Has been struggling with extreme fatigue. Feels \"lazy\" because she is so tired she doesn't want to do anything. Went to [place] for cheer for a week, which was enjoyable. Her son had his first baseball game earlier this week and was sliding into home plate and fractured his ankle. He is now in a boot and crutches for 6-8 weeks. Has been tolerating food well. Has been having a lot of fear of gaining weight since all the trauma that had gone on with the emergency surgery that had occurred 1/25/25 also since gaining some weight back after being on Zepbound last year and went from 150-134lbs. Has more lose skin now, so uncomfortable. Always has gotten attention for her looks. Feels uncomfortable not wearing make up. Patient to follow up with LCSW next month.  Kathia Mcadams, SHERI      "

## 2025-05-02 ENCOUNTER — CLINICAL SUPPORT (OUTPATIENT)
Dept: BARIATRICS | Facility: CLINIC | Age: 40
End: 2025-05-02

## 2025-05-02 DIAGNOSIS — Z98.84 BARIATRIC SURGERY STATUS: Primary | ICD-10-CM

## 2025-05-02 DIAGNOSIS — F41.9 ANXIETY: ICD-10-CM

## 2025-05-02 PROCEDURE — RECHECK

## 2025-05-04 LAB — VIT A SERPL-MCNC: 42.3 UG/DL (ref 18.9–57.3)

## 2025-05-07 LAB — VIT B1 BLD-SCNC: 112.4 NMOL/L (ref 66.5–200)

## 2025-05-30 ENCOUNTER — HOSPITAL ENCOUNTER (OUTPATIENT)
Dept: INFUSION CENTER | Facility: CLINIC | Age: 40
Discharge: HOME | End: 2025-05-30
Attending: SURGERY
Payer: COMMERCIAL

## 2025-05-30 VITALS
TEMPERATURE: 97.4 F | DIASTOLIC BLOOD PRESSURE: 71 MMHG | HEART RATE: 85 BPM | RESPIRATION RATE: 18 BRPM | SYSTOLIC BLOOD PRESSURE: 115 MMHG

## 2025-05-30 DIAGNOSIS — D50.9 IDA (IRON DEFICIENCY ANEMIA): ICD-10-CM

## 2025-05-30 DIAGNOSIS — K91.2 POSTSURGICAL MALABSORPTION: ICD-10-CM

## 2025-05-30 DIAGNOSIS — D50.9 IRON DEFICIENCY ANEMIA, UNSPECIFIED IRON DEFICIENCY ANEMIA TYPE: Primary | ICD-10-CM

## 2025-05-30 PROCEDURE — 96365 THER/PROPH/DIAG IV INF INIT: CPT

## 2025-05-30 RX ORDER — SODIUM CHLORIDE 9 MG/ML
20 INJECTION, SOLUTION INTRAVENOUS ONCE
Status: CANCELLED | OUTPATIENT
Start: 2025-06-05

## 2025-05-30 RX ORDER — SODIUM CHLORIDE 9 MG/ML
20 INJECTION, SOLUTION INTRAVENOUS ONCE
Status: COMPLETED | OUTPATIENT
Start: 2025-05-30 | End: 2025-05-30

## 2025-05-30 RX ADMIN — SODIUM CHLORIDE 20 ML/HR: 9 INJECTION, SOLUTION INTRAVENOUS at 15:46

## 2025-05-30 RX ADMIN — IRON SUCROSE 300 MG: 20 INJECTION, SOLUTION INTRAVENOUS at 15:46

## 2025-05-30 NOTE — PROGRESS NOTES
Shirley Barton tolerated remainder of infusion without complications.  PIV removed and patient left clinic in stable condition.

## 2025-05-30 NOTE — PROGRESS NOTES
Pt presents for venofer infusion offering no complaints. Pt tolerating infusion. Report given to venkat HAN. Next appointment reviewed on 6/5 at 3:30pm.

## 2025-06-05 ENCOUNTER — HOSPITAL ENCOUNTER (OUTPATIENT)
Dept: INFUSION CENTER | Facility: CLINIC | Age: 40
Discharge: HOME | End: 2025-06-05
Attending: SURGERY
Payer: COMMERCIAL

## 2025-06-05 VITALS
SYSTOLIC BLOOD PRESSURE: 108 MMHG | RESPIRATION RATE: 16 BRPM | DIASTOLIC BLOOD PRESSURE: 57 MMHG | TEMPERATURE: 96.8 F | HEART RATE: 82 BPM

## 2025-06-05 DIAGNOSIS — D50.9 IRON DEFICIENCY ANEMIA, UNSPECIFIED IRON DEFICIENCY ANEMIA TYPE: ICD-10-CM

## 2025-06-05 DIAGNOSIS — K91.2 POSTSURGICAL MALABSORPTION: Primary | ICD-10-CM

## 2025-06-05 PROCEDURE — 96365 THER/PROPH/DIAG IV INF INIT: CPT

## 2025-06-05 PROCEDURE — 96366 THER/PROPH/DIAG IV INF ADDON: CPT

## 2025-06-05 RX ORDER — SODIUM CHLORIDE 9 MG/ML
20 INJECTION, SOLUTION INTRAVENOUS ONCE
Status: COMPLETED | OUTPATIENT
Start: 2025-06-05 | End: 2025-06-05

## 2025-06-05 RX ORDER — SODIUM CHLORIDE 9 MG/ML
20 INJECTION, SOLUTION INTRAVENOUS ONCE
Status: CANCELLED | OUTPATIENT
Start: 2025-06-19

## 2025-06-05 RX ADMIN — IRON SUCROSE 300 MG: 20 INJECTION, SOLUTION INTRAVENOUS at 15:45

## 2025-06-05 RX ADMIN — SODIUM CHLORIDE 20 ML/HR: 9 INJECTION, SOLUTION INTRAVENOUS at 15:43

## 2025-06-05 NOTE — PROGRESS NOTES
Patient tolerated remainder of Venofer infusion. PIV removed. AVS declined.     Next appointment: 6/19/25 @ 0830    Appointments adjusted per patient request.

## 2025-06-05 NOTE — PROGRESS NOTES
Shirley Barton presents for Venofer, offers no complaints. PIV placed with positive blood return. Currently tolerating treatment well with no complications. Hand off report given to Karlie Sawant RN.    Shirley Barton is aware of future appt on 6/12 at 3 pm.     AVS declined.

## 2025-06-06 ENCOUNTER — TELEPHONE (OUTPATIENT)
Age: 40
End: 2025-06-06

## 2025-06-06 NOTE — TELEPHONE ENCOUNTER
Patient calling in and feeling very sick she got infusions yesterday and thinking maybe that is it but she had to cancel her appt with Susan this morning and rescheduled to July 7

## 2025-06-19 ENCOUNTER — HOSPITAL ENCOUNTER (OUTPATIENT)
Dept: INFUSION CENTER | Facility: CLINIC | Age: 40
Discharge: HOME | End: 2025-06-19
Attending: SURGERY
Payer: COMMERCIAL

## 2025-06-19 VITALS
HEART RATE: 64 BPM | RESPIRATION RATE: 18 BRPM | SYSTOLIC BLOOD PRESSURE: 102 MMHG | TEMPERATURE: 97.1 F | DIASTOLIC BLOOD PRESSURE: 58 MMHG

## 2025-06-19 DIAGNOSIS — D50.9 IRON DEFICIENCY ANEMIA, UNSPECIFIED IRON DEFICIENCY ANEMIA TYPE: ICD-10-CM

## 2025-06-19 DIAGNOSIS — K91.2 POSTSURGICAL MALABSORPTION: Primary | ICD-10-CM

## 2025-06-19 PROCEDURE — 96365 THER/PROPH/DIAG IV INF INIT: CPT

## 2025-06-19 RX ORDER — SODIUM CHLORIDE 9 MG/ML
20 INJECTION, SOLUTION INTRAVENOUS ONCE
Status: CANCELLED | OUTPATIENT
Start: 2025-06-26

## 2025-06-19 RX ORDER — SODIUM CHLORIDE 9 MG/ML
20 INJECTION, SOLUTION INTRAVENOUS ONCE
Status: COMPLETED | OUTPATIENT
Start: 2025-06-19 | End: 2025-06-19

## 2025-06-19 RX ADMIN — IRON SUCROSE 300 MG: 20 INJECTION, SOLUTION INTRAVENOUS at 08:27

## 2025-06-19 RX ADMIN — SODIUM CHLORIDE 20 ML/HR: 9 INJECTION, SOLUTION INTRAVENOUS at 08:27

## 2025-06-19 NOTE — PROGRESS NOTES
Pt presents for venofer infusion offering no complaints. Pt tolerated treatment without incident. PIV removed.  AVS declined, next appointment reviewed on 6/26 at 7:30am.

## 2025-06-26 ENCOUNTER — HOSPITAL ENCOUNTER (OUTPATIENT)
Dept: INFUSION CENTER | Facility: CLINIC | Age: 40
Discharge: HOME | End: 2025-06-26
Attending: SURGERY
Payer: COMMERCIAL

## 2025-06-26 VITALS
HEART RATE: 58 BPM | SYSTOLIC BLOOD PRESSURE: 107 MMHG | DIASTOLIC BLOOD PRESSURE: 51 MMHG | TEMPERATURE: 97.2 F | RESPIRATION RATE: 18 BRPM

## 2025-06-26 DIAGNOSIS — D50.9 IRON DEFICIENCY ANEMIA, UNSPECIFIED IRON DEFICIENCY ANEMIA TYPE: ICD-10-CM

## 2025-06-26 DIAGNOSIS — K91.2 POSTSURGICAL MALABSORPTION: Primary | ICD-10-CM

## 2025-06-26 PROCEDURE — 96366 THER/PROPH/DIAG IV INF ADDON: CPT

## 2025-06-26 PROCEDURE — 96365 THER/PROPH/DIAG IV INF INIT: CPT

## 2025-06-26 RX ORDER — SODIUM CHLORIDE 9 MG/ML
20 INJECTION, SOLUTION INTRAVENOUS ONCE
OUTPATIENT
Start: 2025-06-26

## 2025-06-26 RX ORDER — SODIUM CHLORIDE 9 MG/ML
20 INJECTION, SOLUTION INTRAVENOUS ONCE
Status: COMPLETED | OUTPATIENT
Start: 2025-06-26 | End: 2025-06-26

## 2025-06-26 RX ADMIN — IRON SUCROSE 300 MG: 20 INJECTION, SOLUTION INTRAVENOUS at 07:53

## 2025-06-26 RX ADMIN — SODIUM CHLORIDE 20 ML/HR: 9 INJECTION, SOLUTION INTRAVENOUS at 07:50

## 2025-06-26 NOTE — PROGRESS NOTES
Pt here today for an iron infusion, offers no complaints, infusion completed w/o complications, aware of there next appt on 7/10 at 3:30, AVS given and pt D/C home

## 2025-07-02 ENCOUNTER — APPOINTMENT (OUTPATIENT)
Age: 40
End: 2025-07-02
Attending: PHYSICIAN ASSISTANT
Payer: COMMERCIAL

## 2025-07-02 DIAGNOSIS — K91.2 POSTSURGICAL MALABSORPTION: ICD-10-CM

## 2025-07-02 DIAGNOSIS — D50.9 IDA (IRON DEFICIENCY ANEMIA): ICD-10-CM

## 2025-07-02 LAB
BASOPHILS # BLD AUTO: 0.05 THOUSANDS/ÂΜL (ref 0–0.1)
BASOPHILS NFR BLD AUTO: 1 % (ref 0–1)
EOSINOPHIL # BLD AUTO: 0.09 THOUSAND/ÂΜL (ref 0–0.61)
EOSINOPHIL NFR BLD AUTO: 1 % (ref 0–6)
ERYTHROCYTE [DISTWIDTH] IN BLOOD BY AUTOMATED COUNT: 27.4 % (ref 11.6–15.1)
FERRITIN SERPL-MCNC: 98 NG/ML (ref 30–307)
HCT VFR BLD AUTO: 36.6 % (ref 34.8–46.1)
HGB BLD-MCNC: 11.7 G/DL (ref 11.5–15.4)
IMM GRANULOCYTES # BLD AUTO: 0.03 THOUSAND/UL (ref 0–0.2)
IMM GRANULOCYTES NFR BLD AUTO: 0 % (ref 0–2)
IRON SATN MFR SERPL: 25 % (ref 15–50)
IRON SERPL-MCNC: 86 UG/DL (ref 50–212)
LYMPHOCYTES # BLD AUTO: 1.31 THOUSANDS/ÂΜL (ref 0.6–4.47)
LYMPHOCYTES NFR BLD AUTO: 18 % (ref 14–44)
MCH RBC QN AUTO: 26.6 PG (ref 26.8–34.3)
MCHC RBC AUTO-ENTMCNC: 32 G/DL (ref 31.4–37.4)
MCV RBC AUTO: 83 FL (ref 82–98)
MONOCYTES # BLD AUTO: 0.22 THOUSAND/ÂΜL (ref 0.17–1.22)
MONOCYTES NFR BLD AUTO: 3 % (ref 4–12)
NEUTROPHILS # BLD AUTO: 5.51 THOUSANDS/ÂΜL (ref 1.85–7.62)
NEUTS SEG NFR BLD AUTO: 77 % (ref 43–75)
NRBC BLD AUTO-RTO: 0 /100 WBCS
PLATELET # BLD AUTO: 197 THOUSANDS/UL (ref 149–390)
PMV BLD AUTO: 11.5 FL (ref 8.9–12.7)
RBC # BLD AUTO: 4.4 MILLION/UL (ref 3.81–5.12)
TIBC SERPL-MCNC: 345.8 UG/DL (ref 250–450)
TRANSFERRIN SERPL-MCNC: 247 MG/DL (ref 203–362)
UIBC SERPL-MCNC: 260 UG/DL (ref 155–355)
WBC # BLD AUTO: 7.21 THOUSAND/UL (ref 4.31–10.16)

## 2025-07-02 PROCEDURE — 82728 ASSAY OF FERRITIN: CPT

## 2025-07-02 PROCEDURE — 83540 ASSAY OF IRON: CPT

## 2025-07-02 PROCEDURE — 85025 COMPLETE CBC W/AUTO DIFF WBC: CPT | Performed by: PHYSICIAN ASSISTANT

## 2025-07-02 PROCEDURE — 83550 IRON BINDING TEST: CPT

## 2025-07-02 PROCEDURE — 36415 COLL VENOUS BLD VENIPUNCTURE: CPT

## 2025-07-03 ENCOUNTER — OFFICE VISIT (OUTPATIENT)
Dept: INTERNAL MEDICINE CLINIC | Facility: CLINIC | Age: 40
End: 2025-07-03
Payer: COMMERCIAL

## 2025-07-03 VITALS
HEIGHT: 62 IN | BODY MASS INDEX: 28.34 KG/M2 | HEART RATE: 62 BPM | WEIGHT: 154 LBS | OXYGEN SATURATION: 98 % | DIASTOLIC BLOOD PRESSURE: 64 MMHG | SYSTOLIC BLOOD PRESSURE: 106 MMHG

## 2025-07-03 DIAGNOSIS — D50.9 IRON DEFICIENCY ANEMIA, UNSPECIFIED IRON DEFICIENCY ANEMIA TYPE: ICD-10-CM

## 2025-07-03 DIAGNOSIS — F41.1 ANXIETY STATE: Primary | ICD-10-CM

## 2025-07-03 DIAGNOSIS — R73.01 IMPAIRED FASTING GLUCOSE: ICD-10-CM

## 2025-07-03 DIAGNOSIS — Z13.6 SCREENING FOR HEART DISEASE: ICD-10-CM

## 2025-07-03 PROCEDURE — 99214 OFFICE O/P EST MOD 30 MIN: CPT | Performed by: PHYSICIAN ASSISTANT

## 2025-07-03 RX ORDER — FERROUS SULFATE 324(65)MG
324 TABLET, DELAYED RELEASE (ENTERIC COATED) ORAL
COMMUNITY
Start: 2025-06-11

## 2025-07-03 NOTE — PATIENT INSTRUCTIONS
No change in current medications.  Plan follow-up with repeat labs in 6 months.  Continue follow-up with specialist as needed.

## 2025-07-03 NOTE — ASSESSMENT & PLAN NOTE
Following with bariatrics, receiving iron infusions.    Orders:    CBC and differential; Future    Comprehensive metabolic panel; Future

## 2025-07-03 NOTE — RESULT ENCOUNTER NOTE
PINA resolved s/p infusions - continue with daily oral iron as tolerated VITRON C and repeat labs in about 4-6 months thank you

## (undated) DEVICE — SMOKE REMOVAL SYSTEM: Brand: BOEHRINGER® SMOKE REMOVAL SYSTEM

## (undated) DEVICE — INTENDED FOR TISSUE SEPARATION, AND OTHER PROCEDURES THAT REQUIRE A SHARP SURGICAL BLADE TO PUNCTURE OR CUT.: Brand: BARD-PARKER SAFETY BLADES SIZE 15, STERILE

## (undated) DEVICE — CHLORAPREP HI-LITE 26ML ORANGE

## (undated) DEVICE — INSUFLATION TUBING INSUFLOW (LEXION)

## (undated) DEVICE — TROCAR: Brand: KII® SLEEVE

## (undated) DEVICE — 2, DISPOSABLE SUCTION/IRRIGATOR WITHOUT DISPOSABLE TIP: Brand: STRYKEFLOW

## (undated) DEVICE — PAD GROUNDING DUAL ADULT

## (undated) DEVICE — ADHESIVE SKIN CLOSURE SYS EXOFIN FUSION 22CM

## (undated) DEVICE — ADHESIVE SKIN HIGH VISCOSITY EXOFIN 1ML

## (undated) DEVICE — SYRINGE 20ML LL

## (undated) DEVICE — DRAPE EQUIPMENT RF WAND

## (undated) DEVICE — METZENBAUM ADTEC SINGLE USE DISSECTING SCISSORS, SHAFT ONLY, MONOPOLAR, CURVED TO LEFT, WORKING LENGTH: 12 1/4", (310 MM), DIAM. 5 MM, INSULATED, DOUBLE ACTION, STERILE, DISPOSABLE, PACKAGE OF 10 PIECES: Brand: AESCULAP

## (undated) DEVICE — NEEDLE SPINAL 20G X 3.5 LF

## (undated) DEVICE — ALLENTOWN LAP CHOLE APP PACK: Brand: CARDINAL HEALTH

## (undated) DEVICE — HARMONIC ACE +7 LAPAROSCOPIC SHEARS ADVANCED HEMOSTASIS 5MM DIAMETER 36CM SHAFT LENGTH  FOR USE WITH GRAY HAND PIECE ONLY: Brand: HARMONIC ACE

## (undated) DEVICE — ENDOPATH PNEUMONEEDLE INSUFFLATION NEEDLES WITH LUER LOCK CONNECTORS 120MM: Brand: ENDOPATH

## (undated) DEVICE — GLOVE INDICATOR PI UNDERGLOVE SZ 7 BLUE

## (undated) DEVICE — SCD SEQUENTIAL COMPRESSION COMFORT SLEEVE MEDIUM KNEE LENGTH: Brand: KENDALL SCD

## (undated) DEVICE — 4-PORT MANIFOLD: Brand: NEPTUNE 2

## (undated) DEVICE — VISUALIZATION SYSTEM: Brand: CLEARIFY

## (undated) DEVICE — TIBURON LAPAROSCOPIC ABDOMINAL DRAPE: Brand: CONVERTORS

## (undated) DEVICE — WEBRIL 6 IN UNSTERILE

## (undated) DEVICE — GLOVE SRG BIOGEL ECLIPSE 7.5

## (undated) DEVICE — ENDOPATH XCEL BLADELESS TROCARS WITH STABILITY SLEEVES: Brand: ENDOPATH XCEL

## (undated) DEVICE — TROCAR: Brand: KII FIOS FIRST ENTRY

## (undated) DEVICE — GLOVE SRG BIOGEL ECLIPSE 8

## (undated) DEVICE — INSUFFLATION NEEDLE TO ESTABLISH PNEUMOPERITONEUM.: Brand: INSUFFLATION NEEDLE

## (undated) DEVICE — GLOVE SRG BIOGEL 7

## (undated) DEVICE — SURGICAL GOWN, XL SMARTSLEEVE: Brand: CONVERTORS

## (undated) DEVICE — INTENDED FOR TISSUE SEPARATION, AND OTHER PROCEDURES THAT REQUIRE A SHARP SURGICAL BLADE TO PUNCTURE OR CUT.: Brand: BARD-PARKER SAFETY BLADES SIZE 11, STERILE

## (undated) DEVICE — NEEDLE SPINAL18G X 3.5 IN QUINCKE